# Patient Record
Sex: MALE | Race: WHITE
[De-identification: names, ages, dates, MRNs, and addresses within clinical notes are randomized per-mention and may not be internally consistent; named-entity substitution may affect disease eponyms.]

---

## 2017-06-21 ENCOUNTER — HOSPITAL ENCOUNTER (INPATIENT)
Dept: HOSPITAL 47 - 6SEL | Age: 67
LOS: 4 days | Discharge: HOME | DRG: 377 | End: 2017-06-25
Payer: MEDICARE

## 2017-06-21 VITALS — BODY MASS INDEX: 31.8 KG/M2

## 2017-06-21 DIAGNOSIS — N40.0: ICD-10-CM

## 2017-06-21 DIAGNOSIS — Z87.891: ICD-10-CM

## 2017-06-21 DIAGNOSIS — D62: ICD-10-CM

## 2017-06-21 DIAGNOSIS — R79.1: ICD-10-CM

## 2017-06-21 DIAGNOSIS — M19.90: ICD-10-CM

## 2017-06-21 DIAGNOSIS — I71.4: ICD-10-CM

## 2017-06-21 DIAGNOSIS — I25.5: ICD-10-CM

## 2017-06-21 DIAGNOSIS — T50.995A: ICD-10-CM

## 2017-06-21 DIAGNOSIS — K57.31: Primary | ICD-10-CM

## 2017-06-21 DIAGNOSIS — I25.2: ICD-10-CM
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
## 2017-06-21 DIAGNOSIS — Z95.1: ICD-10-CM

## 2017-06-21 DIAGNOSIS — J44.9: ICD-10-CM

## 2017-06-21 DIAGNOSIS — I48.2: ICD-10-CM

## 2017-06-21 DIAGNOSIS — Z88.8: ICD-10-CM

## 2017-06-21 DIAGNOSIS — E78.5: ICD-10-CM

## 2017-06-21 DIAGNOSIS — Z79.899: ICD-10-CM

## 2017-06-21 DIAGNOSIS — Z79.02: ICD-10-CM

## 2017-06-21 DIAGNOSIS — K21.9: ICD-10-CM

## 2017-06-21 DIAGNOSIS — Z79.82: ICD-10-CM

## 2017-06-21 DIAGNOSIS — I08.1: ICD-10-CM

## 2017-06-21 DIAGNOSIS — T45.515A: ICD-10-CM

## 2017-06-21 DIAGNOSIS — K29.60: ICD-10-CM

## 2017-06-21 DIAGNOSIS — Z95.5: ICD-10-CM

## 2017-06-21 DIAGNOSIS — K64.8: ICD-10-CM

## 2017-06-21 DIAGNOSIS — G47.33: ICD-10-CM

## 2017-06-21 DIAGNOSIS — I25.10: ICD-10-CM

## 2017-06-21 DIAGNOSIS — R57.8: ICD-10-CM

## 2017-06-21 DIAGNOSIS — E66.9: ICD-10-CM

## 2017-06-21 DIAGNOSIS — Z95.810: ICD-10-CM

## 2017-06-21 DIAGNOSIS — G72.0: ICD-10-CM

## 2017-06-21 DIAGNOSIS — I47.2: ICD-10-CM

## 2017-06-21 DIAGNOSIS — Z79.01: ICD-10-CM

## 2017-06-21 LAB
CH: 25.8
CHCM: 30.1
ERYTHROCYTE [DISTWIDTH] IN BLOOD BY AUTOMATED COUNT: 4.26 M/UL (ref 4.3–5.9)
ERYTHROCYTE [DISTWIDTH] IN BLOOD: 16.5 % (ref 11.5–15.5)
HCT VFR BLD AUTO: 36.6 % (ref 39–53)
HDW: 2.83
HGB BLD-MCNC: 10.9 GM/DL (ref 13–17.5)
INR PPP: 2.8 (ref ?–1.1)
MCH RBC QN AUTO: 25.7 PG (ref 25–35)
MCHC RBC AUTO-ENTMCNC: 29.9 G/DL (ref 31–37)
MCV RBC AUTO: 85.9 FL (ref 80–100)
PT BLD: 27.2 SEC (ref 9–12)
WBC # BLD AUTO: 11.4 K/UL (ref 3.8–10.6)

## 2017-06-21 PROCEDURE — 45378 DIAGNOSTIC COLONOSCOPY: CPT

## 2017-06-21 PROCEDURE — 88342 IMHCHEM/IMCYTCHM 1ST ANTB: CPT

## 2017-06-21 PROCEDURE — 84100 ASSAY OF PHOSPHORUS: CPT

## 2017-06-21 PROCEDURE — 43239 EGD BIOPSY SINGLE/MULTIPLE: CPT

## 2017-06-21 PROCEDURE — 94640 AIRWAY INHALATION TREATMENT: CPT

## 2017-06-21 PROCEDURE — 93306 TTE W/DOPPLER COMPLETE: CPT

## 2017-06-21 PROCEDURE — 86920 COMPATIBILITY TEST SPIN: CPT

## 2017-06-21 PROCEDURE — 85027 COMPLETE CBC AUTOMATED: CPT

## 2017-06-21 PROCEDURE — 86900 BLOOD TYPING SEROLOGIC ABO: CPT

## 2017-06-21 PROCEDURE — 71010: CPT

## 2017-06-21 PROCEDURE — 85610 PROTHROMBIN TIME: CPT

## 2017-06-21 PROCEDURE — 86850 RBC ANTIBODY SCREEN: CPT

## 2017-06-21 PROCEDURE — 80048 BASIC METABOLIC PNL TOTAL CA: CPT

## 2017-06-21 PROCEDURE — 83735 ASSAY OF MAGNESIUM: CPT

## 2017-06-21 PROCEDURE — 86901 BLOOD TYPING SEROLOGIC RH(D): CPT

## 2017-06-21 PROCEDURE — 88305 TISSUE EXAM BY PATHOLOGIST: CPT

## 2017-06-21 PROCEDURE — 85025 COMPLETE CBC W/AUTO DIFF WBC: CPT

## 2017-06-21 PROCEDURE — 84132 ASSAY OF SERUM POTASSIUM: CPT

## 2017-06-21 RX ADMIN — CEFAZOLIN SCH MLS/HR: 330 INJECTION, POWDER, FOR SOLUTION INTRAMUSCULAR; INTRAVENOUS at 23:23

## 2017-06-21 RX ADMIN — MEXILETINE HYDROCHLORIDE SCH MG: 150 CAPSULE ORAL at 23:07

## 2017-06-21 RX ADMIN — ONDANSETRON PRN MG: 2 INJECTION INTRAMUSCULAR; INTRAVENOUS at 23:07

## 2017-06-21 RX ADMIN — TAMSULOSIN HYDROCHLORIDE SCH MG: 0.4 CAPSULE ORAL at 23:07

## 2017-06-22 LAB
ANION GAP SERPL CALC-SCNC: 5 MMOL/L
BASOPHILS # BLD AUTO: 0 K/UL (ref 0–0.2)
BASOPHILS NFR BLD AUTO: 1 %
BUN SERPL-SCNC: 20 MG/DL (ref 9–20)
CALCIUM SPEC-MCNC: 7.7 MG/DL (ref 8.4–10.2)
CH: 25.2
CH: 25.6
CH: 25.8
CH: 26.2
CHCM: 29.3
CHCM: 30
CHCM: 30
CHCM: 30.4
CHLORIDE SERPL-SCNC: 112 MMOL/L (ref 98–107)
CO2 SERPL-SCNC: 20 MMOL/L (ref 22–30)
EOSINOPHIL # BLD AUTO: 0.4 K/UL (ref 0–0.7)
EOSINOPHIL NFR BLD AUTO: 4 %
ERYTHROCYTE [DISTWIDTH] IN BLOOD BY AUTOMATED COUNT: 2.78 M/UL (ref 4.3–5.9)
ERYTHROCYTE [DISTWIDTH] IN BLOOD BY AUTOMATED COUNT: 2.81 M/UL (ref 4.3–5.9)
ERYTHROCYTE [DISTWIDTH] IN BLOOD BY AUTOMATED COUNT: 3.2 M/UL (ref 4.3–5.9)
ERYTHROCYTE [DISTWIDTH] IN BLOOD BY AUTOMATED COUNT: 3.6 M/UL (ref 4.3–5.9)
ERYTHROCYTE [DISTWIDTH] IN BLOOD: 16.1 % (ref 11.5–15.5)
ERYTHROCYTE [DISTWIDTH] IN BLOOD: 16.3 % (ref 11.5–15.5)
ERYTHROCYTE [DISTWIDTH] IN BLOOD: 16.3 % (ref 11.5–15.5)
ERYTHROCYTE [DISTWIDTH] IN BLOOD: 17.4 % (ref 11.5–15.5)
GLUCOSE BLD-MCNC: 108 MG/DL (ref 75–99)
GLUCOSE SERPL-MCNC: 81 MG/DL (ref 74–99)
HCT VFR BLD AUTO: 23.7 % (ref 39–53)
HCT VFR BLD AUTO: 24.9 % (ref 39–53)
HCT VFR BLD AUTO: 27.5 % (ref 39–53)
HCT VFR BLD AUTO: 30.3 % (ref 39–53)
HDW: 2.83
HDW: 2.86
HDW: 2.91
HDW: 2.95
HGB BLD-MCNC: 7.2 GM/DL (ref 13–17.5)
HGB BLD-MCNC: 7.8 GM/DL (ref 13–17.5)
HGB BLD-MCNC: 8.5 GM/DL (ref 13–17.5)
HGB BLD-MCNC: 9.2 GM/DL (ref 13–17.5)
INR PPP: 2.4 (ref ?–1.1)
LUC NFR BLD AUTO: 4 %
LYMPHOCYTES # SPEC AUTO: 1.8 K/UL (ref 1–4.8)
LYMPHOCYTES NFR SPEC AUTO: 20 %
MAGNESIUM SPEC-SCNC: 1.8 MG/DL (ref 1.6–2.3)
MCH RBC QN AUTO: 25.6 PG (ref 25–35)
MCH RBC QN AUTO: 25.9 PG (ref 25–35)
MCH RBC QN AUTO: 26.7 PG (ref 25–35)
MCH RBC QN AUTO: 27.9 PG (ref 25–35)
MCHC RBC AUTO-ENTMCNC: 28.9 G/DL (ref 31–37)
MCHC RBC AUTO-ENTMCNC: 30.4 G/DL (ref 31–37)
MCHC RBC AUTO-ENTMCNC: 31 G/DL (ref 31–37)
MCHC RBC AUTO-ENTMCNC: 33.1 G/DL (ref 31–37)
MCV RBC AUTO: 84.2 FL (ref 80–100)
MCV RBC AUTO: 84.2 FL (ref 80–100)
MCV RBC AUTO: 86 FL (ref 80–100)
MCV RBC AUTO: 89.6 FL (ref 80–100)
MONOCYTES # BLD AUTO: 0.4 K/UL (ref 0–1)
MONOCYTES NFR BLD AUTO: 5 %
NEUTROPHILS # BLD AUTO: 6.2 K/UL (ref 1.3–7.7)
NEUTROPHILS NFR BLD AUTO: 67 %
NON-AFRICAN AMERICAN GFR(MDRD): >60
PHOSPHATE SERPL-MCNC: 2.8 MG/DL (ref 2.5–4.5)
POTASSIUM SERPL-SCNC: 4.2 MMOL/L (ref 3.5–5.1)
PT BLD: 23.1 SEC (ref 9–12)
SODIUM SERPL-SCNC: 137 MMOL/L (ref 137–145)
WBC # BLD AUTO: 0.33 10*3/UL
WBC # BLD AUTO: 7.4 K/UL (ref 3.8–10.6)
WBC # BLD AUTO: 7.7 K/UL (ref 3.8–10.6)
WBC # BLD AUTO: 8.3 K/UL (ref 3.8–10.6)
WBC # BLD AUTO: 9.3 K/UL (ref 3.8–10.6)
WBC (PEROX): 9.31

## 2017-06-22 RX ADMIN — ONDANSETRON PRN MG: 2 INJECTION INTRAMUSCULAR; INTRAVENOUS at 06:41

## 2017-06-22 RX ADMIN — ALLOPURINOL SCH MG: 100 TABLET ORAL at 20:31

## 2017-06-22 RX ADMIN — MEXILETINE HYDROCHLORIDE SCH MG: 150 CAPSULE ORAL at 07:50

## 2017-06-22 RX ADMIN — IPRATROPIUM BROMIDE AND ALBUTEROL SULFATE SCH ML: .5; 3 SOLUTION RESPIRATORY (INHALATION) at 16:21

## 2017-06-22 RX ADMIN — BUDESONIDE AND FORMOTEROL FUMARATE DIHYDRATE SCH PUFF: 80; 4.5 AEROSOL RESPIRATORY (INHALATION) at 08:28

## 2017-06-22 RX ADMIN — MEXILETINE HYDROCHLORIDE SCH MG: 150 CAPSULE ORAL at 17:02

## 2017-06-22 RX ADMIN — PANTOPRAZOLE SODIUM SCH MG: 40 INJECTION, POWDER, FOR SOLUTION INTRAVENOUS at 08:10

## 2017-06-22 RX ADMIN — METOPROLOL SUCCINATE SCH MG: 25 TABLET, EXTENDED RELEASE ORAL at 11:52

## 2017-06-22 RX ADMIN — MAGNESIUM SULFATE IN DEXTROSE SCH MLS/HR: 10 INJECTION, SOLUTION INTRAVENOUS at 06:45

## 2017-06-22 RX ADMIN — MAGNESIUM SULFATE IN DEXTROSE SCH MLS/HR: 10 INJECTION, SOLUTION INTRAVENOUS at 07:49

## 2017-06-22 RX ADMIN — IPRATROPIUM BROMIDE AND ALBUTEROL SULFATE SCH ML: .5; 3 SOLUTION RESPIRATORY (INHALATION) at 11:35

## 2017-06-22 RX ADMIN — ALLOPURINOL SCH MG: 100 TABLET ORAL at 08:10

## 2017-06-22 RX ADMIN — CEFAZOLIN SCH MLS/HR: 330 INJECTION, POWDER, FOR SOLUTION INTRAMUSCULAR; INTRAVENOUS at 00:35

## 2017-06-22 RX ADMIN — IPRATROPIUM BROMIDE AND ALBUTEROL SULFATE SCH ML: .5; 3 SOLUTION RESPIRATORY (INHALATION) at 19:25

## 2017-06-22 RX ADMIN — SPIRONOLACTONE SCH: 25 TABLET, FILM COATED ORAL at 09:26

## 2017-06-22 RX ADMIN — TAMSULOSIN HYDROCHLORIDE SCH MG: 0.4 CAPSULE ORAL at 20:31

## 2017-06-22 RX ADMIN — BUDESONIDE AND FORMOTEROL FUMARATE DIHYDRATE SCH PUFF: 80; 4.5 AEROSOL RESPIRATORY (INHALATION) at 19:24

## 2017-06-22 RX ADMIN — FUROSEMIDE SCH: 40 TABLET ORAL at 09:26

## 2017-06-22 RX ADMIN — LEVOFLOXACIN SCH: 5 INJECTION, SOLUTION INTRAVENOUS at 09:25

## 2017-06-22 RX ADMIN — IPRATROPIUM BROMIDE AND ALBUTEROL SULFATE SCH ML: .5; 3 SOLUTION RESPIRATORY (INHALATION) at 08:28

## 2017-06-22 NOTE — P.CONS
History of Present Illness





- Reason for Consult


Consult date: 06/22/17


GI bleed


Requesting physician: Jamar Agudelo





- History of Present Illness


66-year-old gentleman patient of Dr. Willie Jaramillo with a history of MI, 

CABG, DVT PE atrial fibrillation Coumadin maintenance AICD CAD with PCI 

[place] filter and COPD presents with acute GI bleed.  Yesterday he 

developed lower abdominal discomfort followed by crampy burgundy liquid 

diarrhea.  Denies hematemesis coffee ground emesis.  No NSAID or aspirin usage.

  No alcohol.  Episode of lower GI bleed rectal bleeding 1-2 years ago without 

requiring medical treatment.  Last colonoscopy to his memory 5 years ago; 

diverticular disease.  No EGD recently.  Admission INR 2.8 presently 2.4.  

Hemoglobin 10.9 presently 8.5.  Platelet 178.  BUN 20 creatinine 1.1.





Echo tenses yesterday transferred to the ICU for further monitoring.  No 

pressors.  Denies chest pain or shortness of breath.








Review of Systems


Constitutional: Denies fever, chills, sweats, weight gain, or loss.


HEENT: Negative for migraines, blurred vision or loss, earaches, drainage, 

tinnitus, oral mucosal lesions, dysphagia, or odynophagia.


Cardiac: A. fib.  PE DVT.  MI.  CAD.  CABG.  AICD.  Negative for chest pain, 

arrhythmias, or palpitation.


Respiratory: COPD.  ANJELICA.  Negative for shortness of breath, hemoptysis, cough, 

or sputum production.


Gastrointestinal: See HPI for pertinent findings.


Genitourinary: Negative for hematuria, urgency, frequency, polyuria, dysuria, 

or penile discharge.


Musculoskeletal: Negative for muscle aches, swelling, arthritis, and 

arthralgias.  


Neurologic: Negative for stroke or TIA.


Endocrine: Negative for thyroid problems.


Skin: Negative for rash or itching.


Psychiatric: Negative history for depression and anxiety





All systems: negative (See HPI)





Past Medical History


Past Medical History: COPD, Deep Vein Thrombosis (DVT), GERD/Reflux, Myocardial 

Infarction (MI), Osteoarthritis (OA), Pulmonary Embolus (PE), Sleep Apnea/CPAP/

BIPAP


Additional Past Medical History / Comment(s): SLEEP APNEA DOES'NT USE CPAP 

MACHINE.  SEE DR LADONNA RAMOS&P, "NOSE BLEEDS SINCE MONDAY" (2016)


Last Myocardial Infarction Date:: 1999


History of Any Multi-Drug Resistant Organisms: None Reported


Past Surgical History: AICD, Cardiac Ablation, Cholecystectomy, Coronary Bypass/

CABG, EPS, Heart Catheterization With Stent, Orthopedic Surgery


Additional Past Surgical History / Comment(s): CARDIOVERSIONS, CABG X4 (12/8/13)

, STENT X3, VERO FILTER,   RT ANKLE SX WITH MESH. 4-12-16 ep study-see dr sol h&p


Past Anesthesia/Blood Transfusion Reactions: No Reported Reaction


Additional Past Anesthesia/Blood Transfusion Reaction / Comm: MOM PONV


Date of Last Stent Placement:: 06/2015


Type of Cardiac Device: AICD


Device Placement Date:: UNKNOWN, IAT-Auto


Past Psychological History: No Psychological Hx Reported


Additional Psychological History / Comment(s): PT LIVES WITH HIS WIFE IS 

INDEPENDANT BUT ON DISBILTY. USED TO WORK IN CONSTRUCTION


Smoking Status: Former smoker


Past Alcohol Use History: None Reported


Additional Past Alcohol Use History / Comment(s): STARTED SMOKING 1968-QUIT 2013

, SMOKED 1 AND 1/2PPD


Past Drug Use History: None Reported





- Past Family History


  ** Brother(s)


Family Medical History: Cancer


Additional Family Medical History / Comment(s): LEUKEMIA





  ** Mother


Additional Family Medical History / Comment(s): ENLARGED HEART





  ** Father


Additional Family Medical History / Comment(s): BLOOD CLOT IN THE BRAIN





Medications and Allergies


 Home Medications











 Medication  Instructions  Recorded  Confirmed  Type


 


Albuterol Sulfate [Proair Hfa] 1 puff INHALATION RT-QID PRN 06/03/14 06/21/17 

History


 


Aspirin 81 mg PO DAILY 06/03/14 06/21/17 History


 


Fluticasone/Salmeterol [Advair 1 puff INHALATION RT-BID 06/03/14 06/21/17 

History





250-50 Diskus]    


 


Spironolactone [Aldactone] 25 mg PO DAILY 06/03/14 06/21/17 History


 


Warfarin Sodium [Coumadin] 4 mg PO SUMOWEFR 06/03/14 06/21/17 History


 


Ipratropium Bromide [Atrovent Hfa] 2 puff INHALATION RT-TID PRN 07/08/14 06/21/ 17 History


 


Furosemide [Lasix] 40 mg PO DAILY 06/02/15 06/21/17 History


 


Albuterol Nebulized [Ventolin 2.5 mg INHALATION RT-QID PRN 04/07/16 06/21/17 

History





Nebulized]    


 


Sacubitril/Valsartan [Entresto 24 0.5 tab PO BID 04/07/16 06/21/17 History





mg-26 mg Tablet]    


 


Allopurinol [Zyloprim] 200 mg PO BID 06/21/17 06/21/17 History


 


Celecoxib [CeleBREX] 200 mg PO HS 06/21/17 06/21/17 History


 


Metoprolol Succinate (ER) [Toprol 50 mg PO BID 06/21/17 06/21/17 History





Xl]    


 


Multivitamins, Thera [Multivitamin 1 tab PO DAILY 06/21/17 06/21/17 History





(formulary)]    


 


Tamsulosin [Flomax] 0.4 mg PO HS 06/21/17 06/21/17 History


 


Warfarin Sodium [Coumadin] 6 mg PO TUTHSA 06/21/17 06/21/17 History











 Allergies











Allergy/AdvReac Type Severity Reaction Status Date / Time


 


alirocumab Allergy  Swelling Verified 06/21/17 22:19





[From Praluent Pen]     


 


Statins-Hmg-Coa Reductase Allergy  Swelling/My Verified 06/21/17 22:19





Inhibitor   algia  


 


steriods AdvReac  Abdominal Uncoded 06/21/17 22:19





   Pain/Constipation  














Physical Exam


Vitals: 


 Vital Signs











  Temp Pulse Pulse Resp BP BP BP


 


 06/22/17 10:00   71   22  85/57  


 


 06/22/17 09:00   83   21  88/61  


 


 06/22/17 08:40   65     


 


 06/22/17 08:32   63     


 


 06/22/17 08:30   74   19  90/65  


 


 06/22/17 08:00   71  67  16  90/65  


 


 06/22/17 07:30  98.2 F  76   21  93/63  


 


 06/22/17 07:00   70    89/58  


 


 06/22/17 06:30   71   14  89/58  


 


 06/22/17 06:00   77   16  91/67  


 


 06/22/17 05:30   84   16  91/67  


 


 06/22/17 05:00   66   17  91/61  


 


 06/22/17 04:30   73   23  97/64  


 


 06/22/17 04:00  98.2 F  79  67  13  94/55  


 


 06/22/17 03:30   70   14  93/61  


 


 06/22/17 03:00   64   14  92/66  


 


 06/22/17 02:30   74   14  92/52  


 


 06/22/17 02:00   72   14  92/53  


 


 06/22/17 01:30   72   26 H  97/66  


 


 06/22/17 01:00  98.2 F  68   21  101/63  


 


 06/22/17 00:30   75   9 L  107/71  


 


 06/22/17 00:16   79   14  107/71  


 


 06/21/17 23:05  97.3 F L   73  16    81/51


 


 06/21/17 20:31  97.3 F L   65  16   105/72 














  Pulse Ox


 


 06/22/17 10:00  95


 


 06/22/17 09:00 


 


 06/22/17 08:40 


 


 06/22/17 08:32 


 


 06/22/17 08:30  99


 


 06/22/17 08:00  97


 


 06/22/17 07:30  99


 


 06/22/17 07:00 


 


 06/22/17 06:30 


 


 06/22/17 06:00 


 


 06/22/17 05:30 


 


 06/22/17 05:00  99


 


 06/22/17 04:30  96


 


 06/22/17 04:00  98


 


 06/22/17 03:30  95


 


 06/22/17 03:00  97


 


 06/22/17 02:30  97


 


 06/22/17 02:00  98


 


 06/22/17 01:30  99


 


 06/22/17 01:00  99


 


 06/22/17 00:30  99


 


 06/22/17 00:16  100


 


 06/21/17 23:05  97


 


 06/21/17 20:31  99








 Intake and Output











 06/21/17 06/22/17 06/22/17





 22:59 06:59 14:59


 


Intake Total  1740 840


 


Output Total  0 


 


Balance  1740 840


 


Intake:   


 


  IV  1740 740


 


    Sodium Chloride 0.9% 1,  240 740





    000 ml @ 60 mls/hr IV .   





    [id number] NINI Rx#:[id number]   


 


    Sodium Chloride 0.9% 1,  1000 





    000 ml @ 999 mls/hr IV .   





    Q1H1M ONE Rx#:[id number]   


 


    Sodium Chloride 0.9% 500  500 





    ml @ 999 mls/hr IV .Q31M   





    ONE Rx#:[id number]   


 


  Intake, IV Titration   100





  Amount   


 


    Magnesium Sulfate-D5w Pmx   100





    1 gm In Dextrose/Water 1   





    100ml.bag @ 100 mls/hr   





    IVPB Q1H NINI Rx#:   





    [id number]   


 


Output:   


 


  Urine  0 


 


Other:   


 


  Voiding Method  Urinal Urinal


 


  # Voids  1 


 


  # Bowel Movements 2 1 1


 


  Weight 97.069 kg 96.1 kg 











General appearance: The patient is alert, oriented, in no acute distress.


HET: Head is normocephalic and atraumatic.  Pupils are equal and reactive.  

Oropharynx is clear without lesions.


Neck: Supple without lymphadenopathy.  Trachea midline.


Heart: S1 S2.  Regular rate and rhythm.


Lungs: No crackles or wheezes are heard.


Abdomen: Soft, left-sided abdominal tenderness, nondistended with  bowel 

sounds.  No peritoneal signs.  No palpable organomegaly or masses.


Extremities: Normal skin color and turgor.  No cyanosis, rash, ulceration, 

clubbing, or edema.  Radial and pedal pulses are 2/4 bilaterally.


Neurological: No focal deficits.  Strength and sensation are grossly intact.








Results


CBC & Chem 7: 


 06/22/17 10:03





 06/22/17 03:52


Labs: 


 Abnormal Lab Results - Last 24 Hours (Table)











  06/21/17 06/21/17 06/22/17 Range/Units





  22:25 22:28 00:07 


 


WBC  11.4 H    (3.8-10.6)  k/uL


 


RBC  4.26 L    (4.30-5.90)  m/uL


 


Hgb  10.9 L    (13.0-17.5)  gm/dL


 


Hct  36.6 L    (39.0-53.0)  %


 


MCHC  29.9 L    (31.0-37.0)  g/dL


 


RDW  16.5 H    (11.5-15.5)  %


 


PT   27.2 H   (9.0-12.0)  sec


 


Chloride     ()  mmol/L


 


Carbon Dioxide     (22-30)  mmol/L


 


POC Glucose (mg/dL)    108 H  (75-99)  mg/dL


 


Calcium     (8.4-10.2)  mg/dL














  06/22/17 06/22/17 06/22/17 Range/Units





  03:52 03:52 03:52 


 


WBC     (3.8-10.6)  k/uL


 


RBC  3.60 L    (4.30-5.90)  m/uL


 


Hgb  9.2 L D    (13.0-17.5)  gm/dL


 


Hct  30.3 L    (39.0-53.0)  %


 


MCHC  30.4 L    (31.0-37.0)  g/dL


 


RDW  16.3 H    (11.5-15.5)  %


 


PT    23.1 H  (9.0-12.0)  sec


 


Chloride   112 H   ()  mmol/L


 


Carbon Dioxide   20 L   (22-30)  mmol/L


 


POC Glucose (mg/dL)     (75-99)  mg/dL


 


Calcium   7.7 L   (8.4-10.2)  mg/dL














  06/22/17 Range/Units





  10:03 


 


WBC   (3.8-10.6)  k/uL


 


RBC  3.20 L  (4.30-5.90)  m/uL


 


Hgb  8.5 L  (13.0-17.5)  gm/dL


 


Hct  27.5 L  (39.0-53.0)  %


 


MCHC   (31.0-37.0)  g/dL


 


RDW  16.3 H  (11.5-15.5)  %


 


PT   (9.0-12.0)  sec


 


Chloride   ()  mmol/L


 


Carbon Dioxide   (22-30)  mmol/L


 


POC Glucose (mg/dL)   (75-99)  mg/dL


 


Calcium   (8.4-10.2)  mg/dL














Assessment and Plan


(1) Acute GI bleeding


Narrative/Plan: 


66-year-old gentleman with a history of significant cardiac history including 

atrial fibrillation with Coumadin monitoring, PE DVT, CAD MI with AICD CABG 

presents with acute lower GI bleed burgundy-colored bowel movements with 

abdominal pain and hypotension with history of underlying diverticulosis most 

likely exacerbated by Coumadin-induced coagulopathy.  Suspect bleeding could be 

related to diverticular bleeding however ischemic colitis needs to be kept 

within the differential.  Upper GI pathology cannot be entirely excluded.


Status: Acute   





(2) Acute blood loss anemia


Status: Acute   





(3) Warfarin-induced coagulopathy


Status: Acute   





(4) Symptomatic anemia


Status: Acute   


Plan: 


1.  Protonix 40 mg IV daily.


2.  CBC monitoring.  


3.  Clear liquid diet.


4.  EGD colonoscopy once INR is 1.5 or less.  Endoscopy as early as Saturday 

pending clinical course.


5.  Hold Coumadin.  Will follow closely with you.





The gastroenterologist has discussed the risks, benefits and alternative 

therapies for the above-mentioned procedure and for both sedation/analgesia as 

well as necessary blood product administration, if indicated, as they pertain 

to this patient.  The patient has indicated understanding and acceptance of the 

risks and procedures discussed.





Thank you for this kind referral and the opportunity to participate in the care 

of your patient.  This consultation was discussed with Dr. Hunter.  The 

impression and plan of care have been directed as dictated.

## 2017-06-22 NOTE — CONS
DATE OF CONSULTATION:  



Mr. Hess is a 66-year-old male who was transferred from [facility] because of GI bleeding. Patient has been followed by Dr. Concepcion on a regular basis. He has a known history of severe ischemic 

cardiomyopathy, status post ICD implant, history of atrial 

fibrillation, history of sustained ventricular tachycardia who 

presented with symptoms of lower GI bleeding. He had prior similar 

symptoms in the past and was diagnosed with diverticulosis. He has 

some nausea. His breathing has been stable. He is denying any chest 

pain. He denies any palpitation. He has no peripheral edema. No clear 

PND. No orthopnea. According to him, he had a discharge from the 

device about 2 months ago because he was not taking his medication. 

His ejection fraction in the past was in the 20%. He does not feel 

that his cardiac status is any different than before. He has prior 

history of drug-induced myopathy and has not been on a statin. His 

level of activity has been stable up to this admission. On 

presentation, he was hypotensive and was given fluid. His blood 

pressure is running in the high 80s, but he is asymptomatic. According 

to him, his baseline blood pressure is in the low 100s. His coronary 

risk factors are remarkable for history of hypertension, remote 

history of smoking as well as prior history of hyperlipidemia.  



His medications include Coumadin, Flomax, Aldactone 25 mg daily, 

Entresto 24- 26 mg daily, Mexitil 150 mg 3 times a day, Toprol XL 50 

mg twice a day, Lasix 40 mg daily, Plavix 75 mg daily, aspirin once a 

day, Zyloprim, ProAir and Ventolin.  



REVIEW OF SYSTEMS:

RESPIRATORY SYSTEM: He has no recent wheezing. No cough or fever. 

GI SYSTEM: He had the GI bleeding as noted, the nausea and prior 

history of diverticulosis.  

 SYSTEM: No dysuria or hematuria. 

NERVOUS SYSTEM: No history of stroke or seizure. 



PHYSICAL EXAMINATION: A 66-year-old male, alert, oriented, in no 

apparent distress. Blood pressure 96/60 with the heart rate in the 

70s.  

HEAD: Normocephalic. 

EYES: Sclerae anicteric. 

NECK: No bruit. 

LUNGS: Clear to auscultation. 

HEART: Irregular, irregular. S1, S2, no S3, with systolic murmur at 

the base. No diastolic murmur.  

ABDOMEN: Soft, nontender, positive bowel sounds. 

EXTREMITIES: No edema. 



LAB DATA: Hemoglobin of 9.2. His INR is 2.4. It was 2.8 yesterday. BUN 

and creatinine of 20 and 1.1. Potassium 4.2. His EKG shows atrial 

fibrillation with ventricular pacing.  



IMPRESSION:

1. Lower gastrointestinal bleeding appears to be related to 

diverticulosis. Patient had a known history of diverticulosis.  

2. Severe ischemic cardiomyopathy, status post bi-V ICD implant. 

3. Atrial fibrillation. 

4. Status post coronary artery bypass grafting and percutaneous 

revascularization.  

5. 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 

6. History of ventricular tachycardia. 

7. Hypotension, at this point related to the bleeding. 



RECOMMENDATION: Will hold the Entresto and the diuretic at this time. 

I will start him on a lower dose of beta blocker. I will obtain 

echocardiogram with Doppler. His Coumadin is on hold. Patient does not 

require to go back on Plavix, since his stent was done more than a 

year ago and he can be on Coumadin alone, at this time he has stable 

chronic coronary artery disease. Once his GI bleeding is stabilized, 

then I would recommend to resume his Coumadin, but I will avoid 

antiplatelet treatment.  



Thank you for this consult. We will follow with you.

## 2017-06-22 NOTE — HP
DATE OF ADMISSION:  06/21/2017



CHIEF COMPLAINT:  Lower gastrointestinal bleeding. 





HISTORY OF PRESENT ILLNESS: This 66-year-old gentleman with a past 

medical history of COPD, DVT, gastroesophageal reflux disease, history 

of myocardial infarction, history of DJD, history of pulmonary 

embolus, obstructive sleep apnea, history of AICD, cardiac ablation,  

history of cholecystectomy, coronary artery disease, coronary artery 

bypass grafting, history of coronary artery disease, stent being 

followed by a primary physician in [place] are presented to 

[facility] and subsequently referred to [facility] 

with direct admission for lower gastrointestinal bleeding.  Bleeding 

started last night, mild to moderate quantity but continued throughout 

two days and multiple episodes of bleeding and the patient also 

complained of lower abdominal cramping. The CAT scan done in [facility] showed evidence of diverticulitis and the patient 

admitted for further evaluation and treatment. There is no history of 

any fever, rigors or chills. No history of headache, loss of 

consciousness, seizures.  



PAST MEDICAL HISTORY: COPD, DVT, gastroesophageal reflux disease, 

myocardial infarction, DJD, pulmonary embolism, sleep apnea, history 

of AICD , cardiac ablation, CBD and stent.  



Medications prior to admission include home medications are: 

1. Coumadin 6 mg Tuesday, Thursday and Saturday, 4 mg Sunday, Monday, 

Wednesday and Friday.   

2. Flomax 0.4 q6h p.r.n.  

3. Aldactone 25 mg daily. 

4. Entresto 24/26.5 mg b.i.d. 

5. Nitrostat 0.4 sublingual p.r.n. 

6. Multivitamins one p.o. daily.

7. Mexitil 150 mg q.8. 

8. Metoprolol 50 mg p.o. daily. 

9. Atrovent 2 puffs p.o. t.i.d. p.r.n. 

10. Lasix 40 mg daily. 

11. Advair 250/50 1 puff b.i.d. 

12. Plavix 75 milligrams daily. 

13. Celebrex 200 mg q.h.s. 

14. Aspirin 81 mg daily. 

15. Zyloprim 200 mg b.i.d. 

16. Pro-Air HFA 1 puff p.o. daily. 

17. Ventolin 2.5 q.i.d. p.r.n. 



ALLERGIES: ALAROCUMB, STATINS, STEROIDS.



FAMILY HISTORY: History of cancer, leukemia in the family. 



SOCIAL HISTORY: Previous history of smoking.  Occasional alcohol  

intake.  



REVIEW OF SYSTEMS:

ENT: As mentioned earlier. 

CARDIOVASCULAR: As mentioned earlier. 

RESPIRATORY: As mentioned earlier.

GI: As mentioned earlier. 

GI: As mentioned earlier. 

: No dysuria. Nervous system: No numbness, weakness. 

ALLERGY/IMMUNOLOGY: No asthma or hayfever. 

MUSCULOSKELETAL: As mentioned earlier.

HEMATOLOGY/ONCOLOGY: As mentioned earlier.

ENDOCRINE: no history of diabetes, hypothyroidism.

CONSTITUTIONAL: As mentioned earlier.

DERMATOLOGY: Negative.

RHEUMATOLOGY: Negative.

PSYCHIATRY: As mentioned earlier.



PHYSICAL EXAMINATION:  The patient is alert and oriented times three.  

Pulse is 73, blood pressure 81/51, respiratory  rate 16, temperature 

97.3, pulse ox 97% on room air.  

HEENT: Conjunctivae pale.  Oral mucosa moist. 

NECK: No jugular venous distention.  No carotid bruit. No lymph node 

enlargement.  

CARDIOVASCULAR: S1, S2 muffled.  No S3, no S4. 

RESPIRATORY: Breath sounds diminished at the bases.  A few scattered 

rhonchi, no crackles.  

ABDOMEN: Soft. Mild diffuse discomfort in the lower part of the 

abdomen. No guarding. No rigidity. No mass palpable. Bowel sounds 

present. There is no ascites. Legs: No edema. No swelling. Nervous 

system: Higher function as mentioned.  Moves all four limbs. No focal 

deficits.  

LYMPHATICS: No lymph nodes palpable in the neck, axillae or groin.  

SKIN: No ulcer, rash or bleeding. 



LABS:  WBC 11.2, hemoglobin is 10.9. Platelets are 206.  INR is 2.8. 



ASSESSMENT:

1. Acute lower gastrointestinal bleeding with possibly diverticulosis 

and diverticulitis with acute blood loss anemia.  

2. Hypertension and hypertensive shock secondary to blood loss anemia. 

3. Increased WBC. 

4. Anemia, normocytic. 

5. Coumadin monitoring. 

6. History of chronic obstructive pulmonary disease. 

7. History of deep venous thrombosis and pulmonary embolus. 

8. History of gastroesophageal reflux disease.

9. History of myocardial infarction.

10. History of sleep apnea. 

11. History of degenerative joint disease. 

12. History of sleep apnea uses CPAP. 

13. History of automatic implantable cardioverter defibrillator. 

14. History of cardiac catheterization ablation.

15. History of cholecystectomy.

16. History of coronary artery disease, coronary artery bypass 

grafting and stenting.  

17. History of nicotine dependence. 

18. Obesity, body mass index of 30.7.

19. FULL CODE.



RECOMMENDATIONS AND DISCUSSION:  In this  66 -year-old who presented 

with multiple complex medical issues, we will monitor the patient 

closely. Continue with current medications.  Continue symptomatic  

treatment.  I recommend to hold the antiplatelet agents and continue 

to monitor. The patient has significant GI bleed resulting in 

hypotension. I would recommend reversal of Coumadin as well. Monitor 

in ICU closely with Dr. Albrecht, gastroenterology and also cardiology as 

well.  The prognosis is guarded because of multiple complex medical 

issues. Further recommendations to follow.  See orders for further 

details.  We will hold the antihypertensive medications also for now.

## 2017-06-22 NOTE — ECHOF
Referral Reason:cm



MEASUREMENTS

--------

HEIGHT: 180.3 cm

WEIGHT: 95.7 kg

BP: 96/61

IVSd:   1.0 cm     (0.6 - 1.1)

LVIDd:   6.5 cm     (3.9 - 5.3)

LVPWd:   0.9 cm     (0.6 - 1.1)

IVSs:   1.2 cm

LVIDs:   6.2 cm

LVPWs:   1.1 cm

LAESV Index (A-L):   49.51 ml/m

Ao Diam:   3.5 cm     (2.0 - 3.7)

AV Cusp:   1.8 cm     (1.5 - 2.6)

LA Diam:   5.2 cm     (2.7 - 3.8)

MV EXCURSION:   20.694 mm     (> 18.000)

MV EF SLOPE:   69 mm/s     (70 - 150)

EPSS:   2.2 cm

MV E Handy:   1.01 m/s

MV DecT:   167 ms

MV A Handy:   0.46 m/s

MV E/A Ratio:   2.21 

AR PHT:   405 ms

RAP:   5.00 mmHg

RVSP:   34.94 mmHg







FINDINGS

--------

Pacerwire seen in RV and RA.   AICD

This was a technically good study.

The left ventricle is moderately dilated.   There is 

severe global hypokinesis of LV .   Overall left 

ventricular systolic function is severely impaired 

with, an EF between 25 - 30 %.   Mitral Doppler inflow 

pattern suggests diastolic filling abnormality 22.28.   

Known cardiomyopathy

The right ventricle is normal in size and function.

LA is severely dilated >40 ml/m2

The right atrium is normal in size.

Aortic valve is trileaflet and is mildly thickened.   

Trace amount of aortic regurgitation.

The mitral valve leaflets are mildly thickened.   Mild 

mitral regurgitation is present.

Mild tricuspid regurgitation present.   The right 

ventricular systolic pressure, as measured by Doppler, 

is 34.94mmHg.

Pulmonic valve appears structurally normal.

The aortic root size is normal.

Normal inferior vena cava with normal inspiratory 

collapse consistent with estimated right atrial 

pressure of  5 mmHg.

The pericardium is normal.



CONCLUSIONS

--------

1. Pacerwire seen in RV and RA.

2. LA is severely dilated >40 ml/m2

3. The right atrium is normal in size.

4. Aortic valve is trileaflet and is mildly thickened.

5. Trace amount of aortic regurgitation.

6. The mitral valve leaflets are mildly thickened.

7. Mild mitral regurgitation is present.

8. Mild tricuspid regurgitation present.

9. The right ventricular systolic pressure, as measured by 

Doppler, is 34.94mmHg.

10. Pulmonic valve appears structurally normal.

11. The aortic root size is normal.

12. AICD

13. Normal inferior vena cava with normal inspiratory 

collapse consistent with estimated right atrial 

pressure of  5 mmHg.

14. The pericardium is normal.

15. This was a technically good study.

16. The left ventricle is moderately dilated.

17. There is severe global hypokinesis of LV .

18. Overall left ventricular systolic function is severely 

impaired with, an EF between 25 - 30 %.

19. Mitral Doppler inflow pattern suggest diastolic filling 

abnormality 22.28.

20. Known cardiomyopathy

21. The right ventricle is normal in size and function.





SONOGRAPHER: [place] RDCS

## 2017-06-22 NOTE — P.CNPUL
History of Present Illness


Consult date: 06/22/17


Requesting physician: Jamar Agudelo


Reason for consult: other (Critical care management)


Chief complaint: GI bleed


History of present illness: 





This is a very pleasant 66-year-old woman who follows with Dr. Tapia in the 

[place] area.  He has a past history of chronic obstructive pulmonary 

disease treated with Advair and pro-air, former smoker quit in 2013, sleep 

apnea does not utilize CPAP, hyperlipidemia, benign prosthetic hypertrophy, 

myocardial infarctions 2, ischemic cardiomyopathy with ejection fraction less 

than 20%, status post AICD placement, ablations, status post coronary bypass 

surgery.  Has has a history of atrial fibrillation, PE/DVT, V[place] filter 

placement he is anticoagulated with warfarin.  He does have a history of 

diverticulosis diagnosed proximal a 5-6 years ago and then had an episode of 

bleeding most recently 1-1/2 years ago.  2 evenings ago he started having 

painless dark stools and presented to [place] for the same.  A computed 

tomography scan of the abdomen did reveal evidence of diverticulosis without 

diverticulitis.  There is a stable distal abdominal aortic aneurysm.  Evidence 

of cholecystectomy.  He was subsequently transferred here directly to the 

selective care unit.  Once there he continued to have evidence of GI bleeding 

and hypotension and was subsequently transferred here to the intensive care 

unit.  He is seen today in consultation.  He is currently awake and alert in no 

acute distress.  His hemoglobin has drifted from 10.9-9.2 currently.  His 

initial INR was 2.8 currently 2.4 he did receive vitamin K.  He does have 

complaints of vague abdominal pain otherwise no other complaints.  No shortness 

of breath, cough or congestion.  No chest pain palpitations lightheadedness or 

dizziness.  His mean arterial pressure has remained 60-65.  Not currently on 

any pressors.  His 0.9 normal saline at 60 miles per hour.  Maintaining O2 

saturations in the 90s on room air.





Review of Systems





14 point review of system was conducted.  All negative other than as mentioned 

in HPI.





Past Medical History


Past Medical History: COPD, Deep Vein Thrombosis (DVT), GERD/Reflux, Myocardial 

Infarction (MI), Osteoarthritis (OA), Pulmonary Embolus (PE), Sleep Apnea/CPAP/

BIPAP


Additional Past Medical History / Comment(s): SLEEP APNEA DOES'NT USE CPAP 

MACHINE.  SEE DR DOUGHERTY H&P, "NOSE BLEEDS SINCE MONDAY" (2016)


Last Myocardial Infarction Date:: 1999


History of Any Multi-Drug Resistant Organisms: None Reported


Past Surgical History: AICD, Cardiac Ablation, Cholecystectomy, Coronary Bypass/

CABG, EPS, Heart Catheterization With Stent, Orthopedic Surgery


Additional Past Surgical History / Comment(s): CARDIOVERSIONS, CABG X4 (12/8/13)

, STENT X3, VERO FILTER,   RT ANKLE SX WITH MESH. 4-12-16 ep study-see dr dougherty h&p


Past Anesthesia/Blood Transfusion Reactions: No Reported Reaction


Additional Past Anesthesia/Blood Transfusion Reaction / Comment(s): MOM PONV


Date of Last Stent Placement:: 06/2015


Type of Cardiac Device: AICD


Device Placement Date:: UNKNOWN, AudioTrip


Past Psychological History: No Psychological Hx Reported


Additional Psychological History / Comment(s): PT LIVES WITH HIS WIFE IS 

INDEPENDANT BUT ON DISBILTY. USED TO WORK IN CONSTRUCTION


Smoking Status: Former smoker


Past Alcohol Use History: None Reported


Additional Past Alcohol Use History / Comment(s): STARTED SMOKING 1968-QUIT 2013

, SMOKED 1 AND 1/2PPD


Past Drug Use History: None Reported





- Past Family History


  ** Brother(s)


Family Medical History: Cancer


Additional Family Medical History / Comment(s): LEUKEMIA





  ** Mother


Additional Family Medical History / Comment(s): ENLARGED HEART





  ** Father


Additional Family Medical History / Comment(s): BLOOD CLOT IN THE BRAIN





Medications and Allergies


 Home Medications











 Medication  Instructions  Recorded  Confirmed  Type


 


Albuterol Sulfate [Proair Hfa] 1 puff INHALATION RT-QID PRN 06/03/14 06/21/17 

History


 


Aspirin 81 mg PO DAILY 06/03/14 06/21/17 History


 


Fluticasone/Salmeterol [Advair 1 puff INHALATION RT-BID 06/03/14 06/21/17 

History





250-50 Diskus]    


 


Spironolactone [Aldactone] 25 mg PO DAILY 06/03/14 06/21/17 History


 


Warfarin Sodium [Coumadin] 4 mg PO SUMOWEFR 06/03/14 06/21/17 History


 


Ipratropium Bromide [Atrovent Hfa] 2 puff INHALATION RT-TID PRN 07/08/14 06/21/ 17 History


 


Furosemide [Lasix] 40 mg PO DAILY 06/02/15 06/21/17 History


 


Albuterol Nebulized [Ventolin 2.5 mg INHALATION RT-QID PRN 04/07/16 06/21/17 

History





Nebulized]    


 


Sacubitril/Valsartan [Entresto 24 0.5 tab PO BID 04/07/16 06/21/17 History





mg-26 mg Tablet]    


 


Allopurinol [Zyloprim] 200 mg PO BID 06/21/17 06/21/17 History


 


Celecoxib [CeleBREX] 200 mg PO HS 06/21/17 06/21/17 History


 


Metoprolol Succinate (ER) [Toprol 50 mg PO BID 06/21/17 06/21/17 History





Xl]    


 


Multivitamins, Thera [Multivitamin 1 tab PO DAILY 06/21/17 06/21/17 History





(formulary)]    


 


Tamsulosin [Flomax] 0.4 mg PO HS 06/21/17 06/21/17 History


 


Warfarin Sodium [Coumadin] 6 mg PO TUTHSA 06/21/17 06/21/17 History











 Allergies











Allergy/AdvReac Type Severity Reaction Status Date / Time


 


alirocumab Allergy  Swelling Verified 06/21/17 22:19





[From Praluent Pen]     


 


Statins-Hmg-Coa Reductase Allergy  Swelling/My Verified 06/21/17 22:19





Inhibitor   algia  


 


steriods AdvReac  Abdominal Uncoded 06/21/17 22:19





   Pain/Constipation  














Physical Exam


Vitals: 


 Vital Signs











  Temp Pulse Pulse Resp BP BP BP


 


 06/22/17 09:00   83   21  88/61  


 


 06/22/17 08:40   65     


 


 06/22/17 08:32   63     


 


 06/22/17 08:30   74   19  90/65  


 


 06/22/17 08:00   71  67  16  90/65  


 


 06/22/17 07:30  98.2 F  76   21  93/63  


 


 06/22/17 07:00   70    89/58  


 


 06/22/17 06:30   71   14  89/58  


 


 06/22/17 06:00   77   16  91/67  


 


 06/22/17 05:30   84   16  91/67  


 


 06/22/17 05:00   66   17  91/61  


 


 06/22/17 04:30   73   23  97/64  


 


 06/22/17 04:00  98.2 F  79  67  13  94/55  


 


 06/22/17 03:30   70   14  93/61  


 


 06/22/17 03:00   64   14  92/66  


 


 06/22/17 02:30   74   14  92/52  


 


 06/22/17 02:00   72   14  92/53  


 


 06/22/17 01:30   72   26 H  97/66  


 


 06/22/17 01:00  98.2 F  68   21  101/63  


 


 06/22/17 00:30   75   9 L  107/71  


 


 06/22/17 00:16   79   14  107/71  


 


 06/21/17 23:05  97.3 F L   73  16    81/51


 


 06/21/17 20:31  97.3 F L   65  16   105/72 














  Pulse Ox


 


 06/22/17 09:00 


 


 06/22/17 08:40 


 


 06/22/17 08:32 


 


 06/22/17 08:30  99


 


 06/22/17 08:00  97


 


 06/22/17 07:30  99


 


 06/22/17 07:00 


 


 06/22/17 06:30 


 


 06/22/17 06:00 


 


 06/22/17 05:30 


 


 06/22/17 05:00  99


 


 06/22/17 04:30  96


 


 06/22/17 04:00  98


 


 06/22/17 03:30  95


 


 06/22/17 03:00  97


 


 06/22/17 02:30  97


 


 06/22/17 02:00  98


 


 06/22/17 01:30  99


 


 06/22/17 01:00  99


 


 06/22/17 00:30  99


 


 06/22/17 00:16  100


 


 06/21/17 23:05  97


 


 06/21/17 20:31  99








 Intake and Output











 06/21/17 06/22/17 06/22/17





 22:59 06:59 14:59


 


Intake Total  1740 280


 


Output Total  0 


 


Balance  1740 280


 


Intake:   


 


  IV  1740 180


 


    Sodium Chloride 0.9% 1,  240 180





    000 ml @ 60 mls/hr IV .   





    [id number] [facility] Rx#:[id number]   


 


    Sodium Chloride 0.9% 1,  1000 





    000 ml @ 999 mls/hr IV .   





    Q1H1M [facility] Rx#:[id number]   


 


    Sodium Chloride 0.9% 500  500 





    ml @ 999 mls/hr IV .Q31M   





    ONE Rx#:[id number]   


 


  Intake, IV Titration   100





  Amount   


 


    Magnesium Sulfate-D5w Pmx   100





    1 gm In Dextrose/Water 1   





    100ml.bag @ 100 mls/hr   





    IVPB Q1H [facility] Rx#:   





    [id number]   


 


Output:   


 


  Urine  0 


 


Other:   


 


  Voiding Method  Urinal Urinal


 


  # Voids  1 


 


  # Bowel Movements 2 1 1


 


  Weight 97.069 kg 96.1 kg 














GENERAL EXAM: Alert, fairly comfortable in no apparent distress.


HEAD: Normocephalic.


EYES: Normal reaction of pupils, equal size.


NOSE: Clear with pink turbinates.


THROAT: There is crowding of the posterior pharynx.  No erythema or exudates.


NECK: No masses, no JVD.


CHEST: No chest wall deformity.


LUNGS: Equal air entry with no crackles, wheeze, rhonchi or dullness.


CVS: S1 and S2 normal with an audible murmur, irregular rhythm.


ABDOMEN: No hepatosplenomegaly, normal bowel sounds, no guarding or rigidity.


SPINE: No scoliosis or deformity


SKIN: No rashes


CENTRAL NERVOUS SYSTEM: No focal deficits, tone is normal in all 4 extremities.


Extremities: There is no significant peripheral edema.  No clubbing, no 

cyanosis.  Peripheral pulses are intact.





Results





- Laboratory Findings


CBC and BMP: 


 06/22/17 03:52





 06/22/17 03:52


PT/INR, D-dimer











PT  23.1 sec (9.0-12.0)  H  06/22/17  03:52    


 


INR  2.4  (<1.1)   06/22/17  03:52    








Abnormal lab findings: 


 Abnormal Labs











  06/21/17 06/21/17 06/22/17





  22:25 22:28 00:07


 


WBC  11.4 H  


 


RBC  4.26 L  


 


Hgb  10.9 L  


 


Hct  36.6 L  


 


MCHC  29.9 L  


 


RDW  16.5 H  


 


PT   27.2 H 


 


Chloride   


 


Carbon Dioxide   


 


POC Glucose (mg/dL)    108 H


 


Calcium   














  06/22/17 06/22/17 06/22/17





  03:52 03:52 03:52


 


WBC   


 


RBC  3.60 L  


 


Hgb  9.2 L D  


 


Hct  30.3 L  


 


MCHC  30.4 L  


 


RDW  16.3 H  


 


PT    23.1 H


 


Chloride   112 H 


 


Carbon Dioxide   20 L 


 


POC Glucose (mg/dL)   


 


Calcium   7.7 L 














Assessment and Plan


Plan: 





Impression:





#1 Acute gastrointestinal bleeding secondary to diverticulosis.  Current 

hemoglobin 9.2.  No transfusions required as far.





#2 Coagulopathy secondary to warfarin, did receive vitamin K 2 current INR 

2.4.  Levaquin discontinued.





#3 Coronary artery disease status post coronary artery bypass grafting in 2013, 

previous stent placements 3.





#4 Atrial fibrillation status post cardioversions and ablations.





#5 Severe ischemic cardiomyopathy with an ejection fraction less than 20%, 

status post AICD.





#6 Hyperlipidemia.





#7 Chronic obstructive pulmonary, currently inactive and stable.





#8 History of chronic tobacco dependence quit in 2013.





#9 Previous history of diverticulosis proximal a 5-6 years ago, recurrent 

bleeding approximately 1-1/2 years ago.





Plan:





The patient was seen and evaluated by Dr. Albrecht.  The patient's labs were 

reviewed.  His blood pressure does remain borderline.  We'll give him one more 

liter of fluid resuscitation.  He may require pressor support. Continue to 

monitor hemoglobin closely.  Tinea IV Protonix.  Await GI input.  He remains on 

Symbicort and DuoNeb's.  We'll continue to monitor him here in the intensive 

care unit.  





Critical care time 36 minutes.


Time with Patient: Greater than 30

## 2017-06-22 NOTE — PN
DATE OF SERVICE: 06/22/2017



This is a 66-year-old gentleman admitted with lower gastrointestinal 

bleeding, being closely monitored. Patient has possible diverticular 

bleed. The patient also has abdominal discomfort in the lower part of 

the abdomen. Hemoglobin is 8.5 mg at this time. The patient also has 

hypertension which is being closely monitored. Also multiple 

consultants are following the patient closely including GI. INR is 

still elevated at 2.4.  



PAST MEDICAL HISTORY: Reviewed. 



REVIEW OF SYSTEMS: 

CARDIOVASCULAR: No angina or palpitations. 

RESPIRATORY: As mentioned earlier. 

GI: No nausea. 

: No dysuria. 

NERVOUS SYSTEM: No numbness or weakness. 



Current medications are reviewed and include: 

1. DuoNeb q.i.d. and p.r.n. 

2. Zyloprim 200 mg p.o. b.i.d. 

4. Lasix 40 mg daily. 

5. Levaquin 500 mg IV daily. 

6. Toprol XL 25 mg daily. 

7. Mexiletine 150 mg every 8. 

8. Nitrostat 0.4 sublingual p.r.n. 

9. Zofran 4 mg every 6 hours p.r.n. 

10. Protonix 40 mg IV daily. 

11. IV fluids. 

12. Aldactone 25 mg p.o. b.i.d. 

13. Flomax 0.5 at bedtime. 



PHYSICAL EXAMINATION: 

GENERAL: The patient is alert, oriented x3. Pulse 77, blood pressure 

93/56, respirations 20, temperature 98.4, pulse ox 100% on room air.  

HEENT: Conjunctivae pale. Oral mucosa dry. 

NECK: No distention. No lymph node enlargement. Veins are collapsed. 

CARDIOVASCULAR: S1 and S2 muffled. 

LUNGS: Breath sounds diminished at the bases. Few scattered rhonchi. 

No crackles.  

ABDOMEN: Soft, obese, nontender. 

EXTREMITIES: Legs no edema, no swelling. 

NERVOUS SYSTEM: Higher functions as mentioned. Moves all 4 limbs. No 

focal deficits.  

LYMPH: No lymph node enlargement in the neck, axillae, groin. 

SKIN: No rashes. 



LABS: WBC 9.3, hemoglobin is 8.5, INR is 2.4. 



ASSESSMENT: 

1. Lower gastrointestinal bleeding with possible diverticular bleeding 

with acute blood loss anemia.  

2. Hypotension as well as hypotensive shock secondary to acute blood 

loss anemia and GI bleed.  

3. Increased WBC, possibly reactive. 

4. Anemia, normocytic. 

5. Coumadin monitoring. 

6. History of chronic obstructive pulmonary disease. 

7. History of deep venous thrombosis. 

8. History of gastroesophageal reflux disease. 

9. History of myocardial infarction. 

10. History of sleep apnea. 

11. History of degenerative joint disease. 

12. History of CPAP. 

13. History of automatic implantable cardioverter defibrillator. 

14. History of cardiac catheterization and ablation. 

15. History of cholecystectomy. 

16. History of coronary artery disease and coronary artery bypass 

grafting and stent.  

17. History of nicotine dependence. 

18. Obesity, body mass index of 30.6. 

19. FULL CODE. 



RECOMMENDATIONS/DISCUSSION: Continue with the current medications. 

Continue with monitoring and symptomatic treatment. Otherwise I would 

recommend monitoring hemoglobin closely.  Transfuse periodically. GI 

consultation. Hold Coumadin. Monitor PT and INR closely. Further 

recommendations to follow. Prognosis guarded.  



MTDD

## 2017-06-23 LAB
ANION GAP SERPL CALC-SCNC: 5 MMOL/L
BASOPHILS # BLD AUTO: 0 K/UL (ref 0–0.2)
BASOPHILS NFR BLD AUTO: 0 %
BUN SERPL-SCNC: 12 MG/DL (ref 9–20)
CALCIUM SPEC-MCNC: 7.5 MG/DL (ref 8.4–10.2)
CH: 25.2
CH: 26.2
CHCM: 30
CHCM: 30.6
CHLORIDE SERPL-SCNC: 114 MMOL/L (ref 98–107)
CO2 SERPL-SCNC: 20 MMOL/L (ref 22–30)
EOSINOPHIL # BLD AUTO: 0.3 K/UL (ref 0–0.7)
EOSINOPHIL NFR BLD AUTO: 4 %
ERYTHROCYTE [DISTWIDTH] IN BLOOD BY AUTOMATED COUNT: 2.69 M/UL (ref 4.3–5.9)
ERYTHROCYTE [DISTWIDTH] IN BLOOD BY AUTOMATED COUNT: 3.12 M/UL (ref 4.3–5.9)
ERYTHROCYTE [DISTWIDTH] IN BLOOD: 16.2 % (ref 11.5–15.5)
ERYTHROCYTE [DISTWIDTH] IN BLOOD: 16.3 % (ref 11.5–15.5)
GLUCOSE SERPL-MCNC: 79 MG/DL (ref 74–99)
HCT VFR BLD AUTO: 22.2 % (ref 39–53)
HCT VFR BLD AUTO: 27.2 % (ref 39–53)
HDW: 3
HDW: 3.17
HGB BLD-MCNC: 7.2 GM/DL (ref 13–17.5)
HGB BLD-MCNC: 8.5 GM/DL (ref 13–17.5)
INR PPP: 1.3 (ref ?–1.1)
LUC NFR BLD AUTO: 4 %
LYMPHOCYTES # SPEC AUTO: 1.3 K/UL (ref 1–4.8)
LYMPHOCYTES NFR SPEC AUTO: 17 %
MAGNESIUM SPEC-SCNC: 2 MG/DL (ref 1.6–2.3)
MCH RBC QN AUTO: 26.6 PG (ref 25–35)
MCH RBC QN AUTO: 27.1 PG (ref 25–35)
MCHC RBC AUTO-ENTMCNC: 31.1 G/DL (ref 31–37)
MCHC RBC AUTO-ENTMCNC: 32.2 G/DL (ref 31–37)
MCV RBC AUTO: 82.7 FL (ref 80–100)
MCV RBC AUTO: 87.2 FL (ref 80–100)
MONOCYTES # BLD AUTO: 0.3 K/UL (ref 0–1)
MONOCYTES NFR BLD AUTO: 4 %
NEUTROPHILS # BLD AUTO: 5.2 K/UL (ref 1.3–7.7)
NEUTROPHILS NFR BLD AUTO: 71 %
NON-AFRICAN AMERICAN GFR(MDRD): >60
POTASSIUM SERPL-SCNC: 4.2 MMOL/L (ref 3.5–5.1)
PT BLD: 12.5 SEC (ref 9–12)
SODIUM SERPL-SCNC: 139 MMOL/L (ref 137–145)
WBC # BLD AUTO: 0.27 10*3/UL
WBC # BLD AUTO: 7.4 K/UL (ref 3.8–10.6)
WBC # BLD AUTO: 8.8 K/UL (ref 3.8–10.6)
WBC (PEROX): 7.71

## 2017-06-23 PROCEDURE — 30233N1 TRANSFUSION OF NONAUTOLOGOUS RED BLOOD CELLS INTO PERIPHERAL VEIN, PERCUTANEOUS APPROACH: ICD-10-PCS

## 2017-06-23 RX ADMIN — METOPROLOL SUCCINATE SCH MG: 25 TABLET, EXTENDED RELEASE ORAL at 20:04

## 2017-06-23 RX ADMIN — IPRATROPIUM BROMIDE AND ALBUTEROL SULFATE SCH ML: .5; 3 SOLUTION RESPIRATORY (INHALATION) at 15:26

## 2017-06-23 RX ADMIN — IPRATROPIUM BROMIDE AND ALBUTEROL SULFATE SCH ML: .5; 3 SOLUTION RESPIRATORY (INHALATION) at 19:19

## 2017-06-23 RX ADMIN — ALLOPURINOL SCH MG: 100 TABLET ORAL at 20:05

## 2017-06-23 RX ADMIN — BUDESONIDE AND FORMOTEROL FUMARATE DIHYDRATE SCH PUFF: 80; 4.5 AEROSOL RESPIRATORY (INHALATION) at 19:19

## 2017-06-23 RX ADMIN — LEVOFLOXACIN SCH: 5 INJECTION, SOLUTION INTRAVENOUS at 08:42

## 2017-06-23 RX ADMIN — FUROSEMIDE SCH: 40 TABLET ORAL at 08:46

## 2017-06-23 RX ADMIN — IPRATROPIUM BROMIDE AND ALBUTEROL SULFATE SCH ML: .5; 3 SOLUTION RESPIRATORY (INHALATION) at 07:04

## 2017-06-23 RX ADMIN — IPRATROPIUM BROMIDE AND ALBUTEROL SULFATE SCH ML: .5; 3 SOLUTION RESPIRATORY (INHALATION) at 11:18

## 2017-06-23 RX ADMIN — MEXILETINE HYDROCHLORIDE SCH MG: 150 CAPSULE ORAL at 08:42

## 2017-06-23 RX ADMIN — MEXILETINE HYDROCHLORIDE SCH MG: 150 CAPSULE ORAL at 23:22

## 2017-06-23 RX ADMIN — CEFAZOLIN SCH MLS/HR: 330 INJECTION, POWDER, FOR SOLUTION INTRAMUSCULAR; INTRAVENOUS at 08:40

## 2017-06-23 RX ADMIN — PANTOPRAZOLE SODIUM SCH MG: 40 INJECTION, POWDER, FOR SOLUTION INTRAVENOUS at 08:42

## 2017-06-23 RX ADMIN — MEXILETINE HYDROCHLORIDE SCH MG: 150 CAPSULE ORAL at 14:44

## 2017-06-23 RX ADMIN — FUROSEMIDE SCH MG: 40 TABLET ORAL at 08:42

## 2017-06-23 RX ADMIN — TAMSULOSIN HYDROCHLORIDE SCH MG: 0.4 CAPSULE ORAL at 20:04

## 2017-06-23 RX ADMIN — ALLOPURINOL SCH MG: 100 TABLET ORAL at 08:41

## 2017-06-23 RX ADMIN — MEXILETINE HYDROCHLORIDE SCH MG: 150 CAPSULE ORAL at 01:21

## 2017-06-23 RX ADMIN — BUDESONIDE AND FORMOTEROL FUMARATE DIHYDRATE SCH PUFF: 80; 4.5 AEROSOL RESPIRATORY (INHALATION) at 07:04

## 2017-06-23 RX ADMIN — METOPROLOL SUCCINATE SCH MG: 25 TABLET, EXTENDED RELEASE ORAL at 08:41

## 2017-06-23 RX ADMIN — SPIRONOLACTONE SCH MG: 25 TABLET, FILM COATED ORAL at 08:47

## 2017-06-23 NOTE — P.PN
Subjective


Principal diagnosis: 





Gastrointestinal bleeding





This is a very pleasant 66-year-old woman who follows with Dr. Tapia in the 

[place] area.  He has a past history of chronic obstructive pulmonary 

disease treated with Advair and pro-air, former smoker quit in 2013, sleep 

apnea does not utilize CPAP, hyperlipidemia, benign prosthetic hypertrophy, 

myocardial infarctions 2, ischemic cardiomyopathy with ejection fraction less 

than 20%, status post AICD placement, ablations, status post coronary bypass 

surgery.  Has has a history of atrial fibrillation, PE/DVT, [place] filter 

placement he is anticoagulated with warfarin.  He does have a history of 

diverticulosis diagnosed proximal a 5-6 years ago and then had an episode of 

bleeding most recently 1-1/2 years ago.  2 evenings ago he started having 

painless dark stools and presented to [place] for the same.  A computed 

tomography scan of the abdomen did reveal evidence of diverticulosis without 

diverticulitis.  There is a stable distal abdominal aortic aneurysm.  Evidence 

of cholecystectomy.  He was subsequently transferred here directly to the 

selective care unit.  Once there he continued to have evidence of GI bleeding 

and hypotension and was subsequently transferred here to the intensive care 

unit.  He is seen today in consultation.  He is currently awake and alert in no 

acute distress.  His hemoglobin has drifted from 10.9-9.2 currently.  His 

initial INR was 2.8 currently 2.4 he did receive vitamin K.  He does have 

complaints of vague abdominal pain otherwise no other complaints.  No shortness 

of breath, cough or congestion.  No chest pain palpitations lightheadedness or 

dizziness.  His mean arterial pressure has remained 60-65.  Not currently on 

any pressors.  His 0.9 normal saline at 60 miles per hour.  Maintaining O2 

saturations in the 90s on room air.





The patient is seen again today 06/23/2017 in follow-up in the intensive care 

unit.  He remains awake and alert in no acute distress.  He's had one dark 

tarry stool in the past 24 hours.  He is maintaining hemoglobin at 7.2.  He has 

not required any blood transfusions thus far.  His INR has recovered currently 

at 1.2.  Denies any abdominal discomfort.  No shortness of breath, cough or 

congestion.  He is hemodynamically stable.  He did not require any pressor 

support.  Maintaining O2 saturations in the 90s on room air.





Objective





- Vital Signs


Vital signs: 


 Vital Signs











Temp  98.0 F   06/23/17 08:00


 


Pulse  78   06/23/17 09:00


 


Resp  19   06/23/17 09:00


 


BP  99/59   06/23/17 09:00


 


Pulse Ox  99   06/23/17 09:00








 Intake & Output











 06/22/17 06/23/17 06/23/17





 18:59 06:59 18:59


 


Intake Total 1760 720 180


 


Output Total 900 1050 850


 


Balance 860 -330 -670


 


Weight  96.8 kg 


 


Intake:   


 


  IV 1660 720 180


 


    Sodium Chloride 0.9% 1, 1660 720 180





    000 ml @ 60 mls/hr IV .   





    [id number] [facility] Rx#:[id number]   


 


  Intake, IV Titration 100  





  Amount   


 


    Magnesium Sulfate-D5w Pmx 100  





    1 gm In Dextrose/Water 1   





    100ml.bag @ 100 mls/hr   





    IVPB Q1H [facility] Rx#:   





    [id number]   


 


Output:   


 


  Urine 900 1050 850


 


Other:   


 


  Voiding Method Urinal Urinal Urinal


 


  # Voids 1 1 1


 


  # Bowel Movements 1 1 1














- Exam





GENERAL EXAM: Alert, fairly comfortable in no apparent distress.


HEAD: Normocephalic.


EYES: Normal reaction of pupils, equal size.


NOSE: Clear with pink turbinates.


THROAT: There is crowding of the posterior pharynx.  No erythema or exudates.


NECK: No masses, no JVD.


CHEST: No chest wall deformity.


LUNGS: Equal air entry with no crackles, wheeze, rhonchi or dullness.


CVS: S1 and S2 normal with an audible murmur, irregular rhythm.


ABDOMEN: No hepatosplenomegaly, normal bowel sounds, no guarding or rigidity.


SPINE: No scoliosis or deformity


SKIN: No rashes


CENTRAL NERVOUS SYSTEM: No focal deficits, tone is normal in all 4 extremities.


Extremities: There is no significant peripheral edema.  No clubbing, no 

cyanosis.  Peripheral pulses are intact.





- Labs


CBC & Chem 7: 


 06/23/17 04:22





 06/23/17 04:22


Labs: 


 Abnormal Lab Results - Last 24 Hours (Table)











  06/22/17 06/22/17 06/22/17 Range/Units





  10:03 16:20 22:06 


 


RBC  3.20 L  2.81 L  2.78 L  (4.30-5.90)  m/uL


 


Hgb  8.5 L  7.8 L  7.2 L  (13.0-17.5)  gm/dL


 


Hct  27.5 L  23.7 L  24.9 L  (39.0-53.0)  %


 


MCHC    28.9 L  (31.0-37.0)  g/dL


 


RDW  16.3 H  16.1 H  17.4 H  (11.5-15.5)  %


 


PT     (9.0-12.0)  sec


 


Chloride     ()  mmol/L


 


Carbon Dioxide     (22-30)  mmol/L


 


Calcium     (8.4-10.2)  mg/dL














  06/23/17 06/23/17 06/23/17 Range/Units





  04:22 04:22 04:22 


 


RBC  2.69 L    (4.30-5.90)  m/uL


 


Hgb  7.2 L    (13.0-17.5)  gm/dL


 


Hct  22.2 L    (39.0-53.0)  %


 


MCHC     (31.0-37.0)  g/dL


 


RDW  16.2 H    (11.5-15.5)  %


 


PT    12.5 H  (9.0-12.0)  sec


 


Chloride   114 H   ()  mmol/L


 


Carbon Dioxide   20 L   (22-30)  mmol/L


 


Calcium   7.5 L   (8.4-10.2)  mg/dL














Assessment and Plan


Plan: 





Impression:





#1 Acute gastrointestinal bleeding secondary to diverticulosis.  Current 

hemoglobin 7.2.  No transfusions required as far.





#2 Coagulopathy secondary to warfarin, did receive vitamin K 2 current INR 

1.2.  Levaquin discontinued.





#3 Coronary artery disease status post coronary artery bypass grafting in 2013, 

previous stent placements 3.





#4 Atrial fibrillation status post cardioversions and ablations.





#5 Severe ischemic cardiomyopathy with an ejection fraction less than 20%, 

status post AICD.





#6 Hyperlipidemia.





#7 Chronic obstructive pulmonary, currently inactive and stable.





#8 History of chronic tobacco dependence quit in 2013.





#9 Previous history of diverticulosis proximal a 5-6 years ago, recurrent 

bleeding approximately 1-1/2 years ago.





Plan:





The patient was seen and evaluated by Dr. Albrecht.  The patient's labs were 

reviewed.  He is currently stable from the pulmonary and critical care 

standpoint.  We will await GI services evaluation today.  The plan is for upper 

and lower endoscopies either today or tomorrow.  We'll continue to follow.  He 

could be transferred out of the intensive care unit later today if he remains 

stable.

## 2017-06-23 NOTE — PN
DATE OF SERVICE: 06/23/2017



This 66-year-old gentleman who was admitted with lower 

gastrointestinal bleeding, had possibly diverticular bleed.  The 

patient is noted to have hemoglobin 7.2. The patient also had relative 

hypotensive, 1 L transfusion was arranged for symptomatic anemia at 

this time.  Gastroenterology is planning endoscopies both upper and 

lower. 



Past medical history reviewed.   



REVIEW OF SYSTEMS: 

CARDIOVASCULAR: No angina or palpitations. 

RESPIRATORY: As mentioned earlier.

GASTROINTESTINAL: The patient complaining of mild lower abdominal 

discomfort which is slightly improving.  

: No dysuria. 



Current medications are:

1. DuoNeb q.i.d. and p.r.n. 

2. 200 mg p.o. b.i.d. 

3. Symbicort 80/4.5, 2 puffs b.i.d. 

4. Lasix 40 mg daily.

5. Levaquin 500 mg daily.

6. Toprol XL 25 mg p.o. b.i.d. 

8. Magnesium.

9. Nitrostat.

10. Zofran.

11. Protonix.

12. Aldactone.

13. Flomax. 



PHYSICAL EXAM: Patient is alert and oriented times three.  Pulse is 

77, blood pressure 120/50, respiratory  rate 20, temperature 97.2, 

pulse ox is 99% on BiPAP. 

HEENT: Conjunctivae pale.  Oral mucosa moist. 

NECK: No jugular venous distention. No carotid bruit. No lymph node 

enlargement.  

CARDIOVASCULAR: S1, S2 muffled.  

RESPIRATORY:  Breath sounds diminished at the bases.  A few scattered 

rhonchi.  

ABDOMEN: Soft, nontender. 

LEGS: No edema. No swelling. 

CENTRAL NERVOUS SYSTEM: Higher functions as mentioned earlier. Moves 

all four limbs. LYMPHATICS: No lymph nodes palpable in the neck, 

axillae or groin.   

SKIN: No ulcer, rash or bleeding. 



Labs at this time shows hemoglobin 7.2, INR is 1.3.



ASSESSMENT: 

1. Lower gastrointestinal bleeding with possible acute diverticular 

bleeding with acute blood loss anemia, symptomatic.  

2. Relative hypotension as well as hypertension shock secondary to 

blood loss anemia and gastrointestinal bleed.  

3. Increased WBC, possibly reactive. 

4. Anemia, normocytic. 

5. Coumadin monitoring. 

6. History of chronic obstructive pulmonary disease. 

7. History of deep venous thrombosis. 

8. History of gastroesophageal reflux disease.

9. History of myocardial infarction.

10. Sleep apnea, CPAP. 

11. History of degenerative joint disease .

12. History of CPAP.

13. History of automatic implantable cardioverter defibrillator. 

14. History of cardiac catheterization and ablation. 

15. History of cholecystectomy.

16. Coronary artery disease, coronary artery bypass grafting,  stent. 

17. History of nicotine dependence. 

18. Obesity, body mass index 30.6.

19. 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 

20. FULL CODE. 



RECOMMENDATIONS AND DISCUSSION: Recommend to continue current 

medications.  Continue with monitoring, symptomatic treatment.  

Otherwise, at this time, I recommend to continue with current 

medications, one unit of transfusion as mentioned earlier. Otherwise 

closely monitor.  Endoscopies by gastroenterology.   Prognosis 

guarded. Further recommendations to follow. The patient has a 

biventricular automatic implantable cardioverter defibrillator.   Once 

gastrointestinal bleed stabilizes recommend start Coumadin per 

cardiology.  Otherwise, currently prognosis guarded.  Further 

recommendations to follow.   



MTDD

## 2017-06-23 NOTE — P.PN
Subjective


Principal diagnosis: 





gi bleed


4-5 small burgundy-colored bowel movements yesterday none through the night.  

Feels well.  Hemoglobin 7.2.  INR 1.3.  Denies abdominal pain.








Objective





- Vital Signs


Vital signs: 


 Vital Signs











Temp  98.0 F   06/23/17 08:00


 


Pulse  78   06/23/17 09:00


 


Resp  19   06/23/17 09:00


 


BP  99/59   06/23/17 09:00


 


Pulse Ox  99   06/23/17 09:00








 Intake & Output











 06/22/17 06/23/17 06/23/17





 18:59 06:59 18:59


 


Intake Total 1760 720 180


 


Output Total 900 1050 850


 


Balance 860 330 670


 


Weight  96.8 kg 


 


Intake:   


 


  IV 1660 720 180


 


    Sodium Chloride 0.9% 1, 1660 720 180





    000 ml @ 60 mls/hr IV .   





    [id number] [facility] Rx#:[id number]   


 


  Intake, IV Titration 100  





  Amount   


 


    Magnesium Sulfate-D5w Pmx 100  





    1 gm In Dextrose/Water 1   





    100ml.bag @ 100 mls/hr   





    IVPB Q1H [facility] Rx#:   





    [id number]   


 


Output:   


 


  Urine 900 1050 850


 


Other:   


 


  Voiding Method Urinal Urinal Urinal


 


  # Voids 1 1 1


 


  # Bowel Movements 1 1 1














- Exam


General appearance: The patient is alert, oriented, in no acute distress.


HET: Head is normocephalic and atraumatic.  Pupils are equal and reactive.  

Oropharynx is clear without lesions.


Neck: Supple without lymphadenopathy.  Trachea midline.


Heart: S1 S2.  


Lungs: No crackles or wheezes are heard.


Abdomen: Soft, nontender, nondistended with  bowel sounds.  No peritoneal 

signs.  No palpable organomegaly or masses.


Extremities: Normal skin color and turgor.  No cyanosis, rash, ulceration, 

clubbing, or edema.  Radial and pedal pulses are 2/4 bilaterally.


Neurological: No focal deficits.  Strength and sensation are grossly intact.








- Labs


CBC & Chem 7: 


 06/23/17 04:22





 06/23/17 04:22


Labs: 


 Abnormal Lab Results - Last 24 Hours (Table)











  06/22/17 06/22/17 06/23/17 Range/Units





  16:20 22:06 04:22 


 


RBC  2.81 L  2.78 L  2.69 L  (4.30-5.90)  m/uL


 


Hgb  7.8 L  7.2 L  7.2 L  (13.0-17.5)  gm/dL


 


Hct  23.7 L  24.9 L  22.2 L  (39.0-53.0)  %


 


MCHC   28.9 L   (31.0-37.0)  g/dL


 


RDW  16.1 H  17.4 H  16.2 H  (11.5-15.5)  %


 


PT     (9.0-12.0)  sec


 


Chloride     ()  mmol/L


 


Carbon Dioxide     (22-30)  mmol/L


 


Calcium     (8.4-10.2)  mg/dL














  06/23/17 06/23/17 Range/Units





  04:22 04:22 


 


RBC    (4.30-5.90)  m/uL


 


Hgb    (13.0-17.5)  gm/dL


 


Hct    (39.0-53.0)  %


 


MCHC    (31.0-37.0)  g/dL


 


RDW    (11.5-15.5)  %


 


PT   12.5 H  (9.0-12.0)  sec


 


Chloride  114 H   ()  mmol/L


 


Carbon Dioxide  20 L   (22-30)  mmol/L


 


Calcium  7.5 L   (8.4-10.2)  mg/dL














Assessment and Plan


(1) Acute GI bleeding


Narrative/Plan: 


66-year-old gentleman with a history of significant cardiac history including 

atrial fibrillation with Coumadin monitoring, PE DVT, CAD MI with AICD CABG 

presents with acute lower GI bleed burgundy-colored bowel movements with 

abdominal pain and hypotension with history of underlying diverticulosis most 

likely exacerbated by Coumadin-induced coagulopathy.  Suspect bleeding could be 

related to diverticular bleeding however ischemic colitis needs to be kept 

within the differential.  Upper GI pathology cannot be entirely excluded.


Status: Acute   





(2) Acute blood loss anemia


Status: Acute   





(3) Warfarin-induced coagulopathy


Status: Acute   





(4) Symptomatic anemia


Status: Acute   


Plan: 


1. EGD/colonoscopy tomorrow.


2.  GI prophylaxis.  Monitor CBC.  Continue to hold anticoagulation.








The gastroenterologist has discussed the risks, benefits and alternative 

therapies for the above-mentioned procedure and for both sedation/analgesia as 

well as necessary blood product administration, if indicated, as they pertain 

to this patient.  The patient has indicated understanding and acceptance of the 

risks and procedures discussed.





Assessment and plan of care discussed with Dr. Anderson

## 2017-06-24 LAB
ANION GAP SERPL CALC-SCNC: 7 MMOL/L
BASOPHILS # BLD AUTO: 0 K/UL (ref 0–0.2)
BASOPHILS NFR BLD AUTO: 0 %
BASOPHILS NFR BLD AUTO: 1 %
BASOPHILS NFR BLD AUTO: 1 %
BUN SERPL-SCNC: 7 MG/DL (ref 9–20)
CALCIUM SPEC-MCNC: 7.9 MG/DL (ref 8.4–10.2)
CH: 26.2
CH: 26.3
CH: 26.4
CHCM: 30.2
CHCM: 30.6
CHCM: 30.7
CHLORIDE SERPL-SCNC: 110 MMOL/L (ref 98–107)
CO2 SERPL-SCNC: 20 MMOL/L (ref 22–30)
EOSINOPHIL # BLD AUTO: 0.2 K/UL (ref 0–0.7)
EOSINOPHIL # BLD AUTO: 0.3 K/UL (ref 0–0.7)
EOSINOPHIL # BLD AUTO: 0.3 K/UL (ref 0–0.7)
EOSINOPHIL NFR BLD AUTO: 3 %
ERYTHROCYTE [DISTWIDTH] IN BLOOD BY AUTOMATED COUNT: 2.77 M/UL (ref 4.3–5.9)
ERYTHROCYTE [DISTWIDTH] IN BLOOD BY AUTOMATED COUNT: 2.96 M/UL (ref 4.3–5.9)
ERYTHROCYTE [DISTWIDTH] IN BLOOD BY AUTOMATED COUNT: 3 M/UL (ref 4.3–5.9)
ERYTHROCYTE [DISTWIDTH] IN BLOOD: 16.6 % (ref 11.5–15.5)
ERYTHROCYTE [DISTWIDTH] IN BLOOD: 16.7 % (ref 11.5–15.5)
ERYTHROCYTE [DISTWIDTH] IN BLOOD: 17 % (ref 11.5–15.5)
GLUCOSE SERPL-MCNC: 83 MG/DL (ref 74–99)
HCT VFR BLD AUTO: 23.8 % (ref 39–53)
HCT VFR BLD AUTO: 25.8 % (ref 39–53)
HCT VFR BLD AUTO: 25.9 % (ref 39–53)
HDW: 3.21
HDW: 3.23
HDW: 3.27
HGB BLD-MCNC: 7.3 GM/DL (ref 13–17.5)
HGB BLD-MCNC: 7.8 GM/DL (ref 13–17.5)
HGB BLD-MCNC: 7.9 GM/DL (ref 13–17.5)
INR PPP: 1.2 (ref ?–1.1)
LUC NFR BLD AUTO: 3 %
LUC NFR BLD AUTO: 3 %
LUC NFR BLD AUTO: 4 %
LYMPHOCYTES # SPEC AUTO: 0.8 K/UL (ref 1–4.8)
LYMPHOCYTES # SPEC AUTO: 0.9 K/UL (ref 1–4.8)
LYMPHOCYTES # SPEC AUTO: 1.1 K/UL (ref 1–4.8)
LYMPHOCYTES NFR SPEC AUTO: 10 %
LYMPHOCYTES NFR SPEC AUTO: 10 %
LYMPHOCYTES NFR SPEC AUTO: 12 %
MAGNESIUM SPEC-SCNC: 1.8 MG/DL (ref 1.6–2.3)
MCH RBC QN AUTO: 26.2 PG (ref 25–35)
MCH RBC QN AUTO: 26.2 PG (ref 25–35)
MCH RBC QN AUTO: 26.3 PG (ref 25–35)
MCHC RBC AUTO-ENTMCNC: 30.3 G/DL (ref 31–37)
MCHC RBC AUTO-ENTMCNC: 30.3 G/DL (ref 31–37)
MCHC RBC AUTO-ENTMCNC: 30.5 G/DL (ref 31–37)
MCV RBC AUTO: 85.9 FL (ref 80–100)
MCV RBC AUTO: 86.3 FL (ref 80–100)
MCV RBC AUTO: 87 FL (ref 80–100)
MONOCYTES # BLD AUTO: 0.4 K/UL (ref 0–1)
MONOCYTES # BLD AUTO: 0.4 K/UL (ref 0–1)
MONOCYTES # BLD AUTO: 0.5 K/UL (ref 0–1)
MONOCYTES NFR BLD AUTO: 5 %
MONOCYTES NFR BLD AUTO: 5 %
MONOCYTES NFR BLD AUTO: 6 %
NEUTROPHILS # BLD AUTO: 5.9 K/UL (ref 1.3–7.7)
NEUTROPHILS # BLD AUTO: 6 K/UL (ref 1.3–7.7)
NEUTROPHILS # BLD AUTO: 8.3 K/UL (ref 1.3–7.7)
NEUTROPHILS NFR BLD AUTO: 77 %
NEUTROPHILS NFR BLD AUTO: 77 %
NEUTROPHILS NFR BLD AUTO: 79 %
NON-AFRICAN AMERICAN GFR(MDRD): >60
PHOSPHATE SERPL-MCNC: 2.3 MG/DL (ref 2.5–4.5)
POTASSIUM SERPL-SCNC: 3.9 MMOL/L (ref 3.5–5.1)
PT BLD: 11.8 SEC (ref 9–12)
SODIUM SERPL-SCNC: 137 MMOL/L (ref 137–145)
WBC # BLD AUTO: 0.23 10*3/UL
WBC # BLD AUTO: 0.3 10*3/UL
WBC # BLD AUTO: 0.32 10*3/UL
WBC # BLD AUTO: 10.4 K/UL (ref 3.8–10.6)
WBC # BLD AUTO: 7.7 K/UL (ref 3.8–10.6)
WBC # BLD AUTO: 7.8 K/UL (ref 3.8–10.6)
WBC (PEROX): 10.48
WBC (PEROX): 7.67
WBC (PEROX): 8.06

## 2017-06-24 PROCEDURE — 0DB68ZX EXCISION OF STOMACH, VIA NATURAL OR ARTIFICIAL OPENING ENDOSCOPIC, DIAGNOSTIC: ICD-10-PCS

## 2017-06-24 PROCEDURE — 0DJD8ZZ INSPECTION OF LOWER INTESTINAL TRACT, VIA NATURAL OR ARTIFICIAL OPENING ENDOSCOPIC: ICD-10-PCS

## 2017-06-24 RX ADMIN — ALLOPURINOL SCH: 100 TABLET ORAL at 11:27

## 2017-06-24 RX ADMIN — TAMSULOSIN HYDROCHLORIDE SCH MG: 0.4 CAPSULE ORAL at 20:09

## 2017-06-24 RX ADMIN — MEXILETINE HYDROCHLORIDE SCH MG: 150 CAPSULE ORAL at 22:54

## 2017-06-24 RX ADMIN — SODIUM CHLORIDE SCH MLS/HR: 9 INJECTION, SOLUTION INTRAVENOUS at 08:06

## 2017-06-24 RX ADMIN — IPRATROPIUM BROMIDE AND ALBUTEROL SULFATE SCH ML: .5; 3 SOLUTION RESPIRATORY (INHALATION) at 12:15

## 2017-06-24 RX ADMIN — METOPROLOL SUCCINATE SCH MG: 25 TABLET, EXTENDED RELEASE ORAL at 11:27

## 2017-06-24 RX ADMIN — MEXILETINE HYDROCHLORIDE SCH MG: 150 CAPSULE ORAL at 15:58

## 2017-06-24 RX ADMIN — MAGNESIUM SULFATE IN DEXTROSE SCH MLS/HR: 10 INJECTION, SOLUTION INTRAVENOUS at 09:15

## 2017-06-24 RX ADMIN — SODIUM CHLORIDE SCH MLS/HR: 9 INJECTION, SOLUTION INTRAVENOUS at 07:03

## 2017-06-24 RX ADMIN — MAGNESIUM SULFATE IN DEXTROSE SCH MLS/HR: 10 INJECTION, SOLUTION INTRAVENOUS at 07:03

## 2017-06-24 RX ADMIN — IPRATROPIUM BROMIDE AND ALBUTEROL SULFATE SCH ML: .5; 3 SOLUTION RESPIRATORY (INHALATION) at 18:48

## 2017-06-24 RX ADMIN — SPIRONOLACTONE SCH MG: 25 TABLET, FILM COATED ORAL at 15:59

## 2017-06-24 RX ADMIN — MEXILETINE HYDROCHLORIDE SCH MG: 150 CAPSULE ORAL at 08:05

## 2017-06-24 RX ADMIN — IPRATROPIUM BROMIDE AND ALBUTEROL SULFATE SCH ML: .5; 3 SOLUTION RESPIRATORY (INHALATION) at 07:47

## 2017-06-24 RX ADMIN — METOPROLOL SUCCINATE SCH MG: 25 TABLET, EXTENDED RELEASE ORAL at 20:09

## 2017-06-24 RX ADMIN — ALLOPURINOL SCH MG: 100 TABLET ORAL at 20:08

## 2017-06-24 RX ADMIN — LEVOFLOXACIN SCH MLS/HR: 5 INJECTION, SOLUTION INTRAVENOUS at 08:00

## 2017-06-24 RX ADMIN — PANTOPRAZOLE SODIUM SCH MG: 40 INJECTION, POWDER, FOR SOLUTION INTRAVENOUS at 08:00

## 2017-06-24 RX ADMIN — BUDESONIDE AND FORMOTEROL FUMARATE DIHYDRATE SCH PUFF: 80; 4.5 AEROSOL RESPIRATORY (INHALATION) at 07:46

## 2017-06-24 RX ADMIN — CEFAZOLIN SCH: 330 INJECTION, POWDER, FOR SOLUTION INTRAMUSCULAR; INTRAVENOUS at 06:53

## 2017-06-24 RX ADMIN — FUROSEMIDE SCH MG: 40 TABLET ORAL at 15:58

## 2017-06-24 RX ADMIN — BUDESONIDE AND FORMOTEROL FUMARATE DIHYDRATE SCH PUFF: 80; 4.5 AEROSOL RESPIRATORY (INHALATION) at 18:49

## 2017-06-24 RX ADMIN — IPRATROPIUM BROMIDE AND ALBUTEROL SULFATE SCH: .5; 3 SOLUTION RESPIRATORY (INHALATION) at 14:58

## 2017-06-24 NOTE — PN
This patient has a history of cardiomyopathy. Patient came with 

gastrointestinal bleeding and underwent upper GI and lower GI 

endoscopy. No significant abnormality was detected. Patient remained 

stable. Denies any chest pain. Denies any shortness of breath.  



Heart rate is 77 per minute, blood pressure is 97/59 mmHg. First and 

second heart sounds are normal. Lungs are clinically clear to 

auscultation and percussion.  



RECOMMENDATION: Continue current medications. Patient's Entresto is on 

hold because of the low blood pressure. After the discharge from the 

patient Entresto can be restarted when his blood pressure is 

stabilized.

## 2017-06-24 NOTE — P.PCN
Date of Procedure: 06/24/17


Preoperative Diagnosis: 





Postoperative Diagnosis: 





Procedure(s) Performed: 


Brief history:


Patient is a pleasant 66-year-old white male, admitted to the hospital with 

acute GI bleed.  He had multiple episodes of burgundy-colored stools associated 

with cramping left lower quadrant abdominal pain for the last 2 days' duration.

  He is hence scheduled for an upper endoscopy as well as colonoscopy as a part 

of evaluation of acute GI bleed.





Procedure performed:


Esophagogastroduodenoscopy with biopsy


Colonoscopy





Preoperative diagnosis:


Acute GI bleed


Abdominal pain





Anesthesia: MAC





Procedure:


After informed consent was obtained from the patient  was brought into the 

endoscopy unit and IV  sedation was administered by anesthesia under continuous 

monitoring.  Initially upper endoscopy was done.  The Olympus  video 

endoscope was inserted inserted into the mouth and esophagus intubated without 

any difficulty and was gradually advanced into the stomach and duodenum and 

carefully examined.  The bulb and second part of the duodenum appeared normal.  

The scope was then withdrawn into the stomach adequately insufflated with air 

and upon careful examination  the antrum had scattered erosions but no active 

bleeding and biopsies were done from this area.  The  body, cardia and fundus 

appeared normal.  The scope was then withdrawn into the esophagus.  The GE 

junction was located at 40 cm to the incisors.  It appeared regular with no 

erythema erosions or ulcerations.  Rest of the esophagus appeared normal.  

Patient tolerated the procedure well.





At this time the patient continued to remain sedation.  Initial digital rectal 

examination was normal.  Olympus  video colonoscope was then inserted 

into the rectum and gradually advanced to the cecum without any difficulty.  

Careful examination was performed as the scope was gradually being withdrawn.  

The prep was excellent.  The cecum, ascending colon, transverse colon, 

descending colon, sigmoid colon and rectum appeared normal. moderate ascites 

diverticulosis seen.  No active bleeding noted.  Retroflexion was performed in 

the rectum and  small internal hemorrhoidswere noted.  Patient tolerated the 

procedure well.





Impression:


1. Upper endoscopy revealed antral erosive gastritis but no evidence of upper 

GI bleed


2. Colonoscopy revealed moderate left-sided diverticulosis and small internal 

hemorrhoids.  No active bleeding seen.








Recommendations:


Findings of this examination were discussed with the patient .  Most likely her 

recent episode of bleed was diverticular in nature which has spontaneously 

resolved.  He'll be started on a soft diet.


Implants: 





Indications for Procedure: 





Operative Findings: 





Description of Procedure:

## 2017-06-24 NOTE — XR
EXAMINATION TYPE: XR chest 1V portable

 

DATE OF EXAM: 6/24/2017

History chest pain and short of breath.

Comparison 4/20/2016.

 

Technique single view.

 

FINDINGS:

Heart is enlarged. There is no gross heart failure. There is coarsening of interstitial markings. The
re is a left axillary pacemaker with the lead tips in the right ventricle. I see no pleural effusion.
 There are chest leads.

 

CONCLUSION:

Cardiomegaly. No heart failure. Pulmonary fibrotic changes. No significant change compared to old exa
m.

## 2017-06-24 NOTE — PN
DATE OF SERVICE:  06/24/2017



This 66 -year-old gentleman admitted with lower gastrointestinal 

bleeding and acute blood loss anemia, received one transfusion. 

Hemoglobin went up to 8.5 from 7.2 but today again it is 7.3.   No 

active bleeding reported. The patient also underwent EGD and 

colonoscopy by Dr. Anderson.  EGD showed  antral erosive gastritis and 

colonoscopy showed diverticulosis without any active stigmata of 

bleeding, however,  Dr. Anderson believes the bleeding could be related 

to diverticular disease.  



Past medical history reviewed. 



REVIEW OF SYSTEMS: 

CARDIOVASCULAR: No angina or palpitations. 

RESPIRATORY SYSTEM: As mentioned earlier. 

GASTROINTESTINAL: As mentioned earlier.

: No dysuria. Nervous system: As mentioned earlier.



Current medications are reviewed and include: 

1. Duoneb q.i.d. and p.r.n. 

2. Zyloprim 200 mg p.o. b.i.d. 

3. Symbicort 80/4.5, 2 puffs b.i.d. 

4. Lasix 40 mg daily.

5. Levaquin 500 mg daily. 

6. Toprol XL 25 mg daily. 

7. Mexiletine 150 q8h. 

8. Nitrostat 0.4 sublingual p.r.n.  

9. Zofran 4 mg IV q.6. 

10. Protonix 40 mg daily.

11. Aldactone 25 milligrams.

12. Flomax 0.4 at bedtime. 



PHYSICAL EXAMINATION: Patient is alert and oriented x3. Pulse 75.  

Blood pressure 97/59.  Respiratory  rate 16.  Temperature normal. 

Pulse ox 97% on room air.  

HEENT: Conjunctivae normal.  Oral mucosa moist. 

NECK: No JVD. No carotid bruit.  No lymph node enlargement. 

CARDIOVASCULAR: S1, S2 muffled.  No S3, no S4. 

RESPIRATORY: Breath sounds diminished at the bases. A few scattered 

rhonchi, no crackles.  

ABDOMEN: Soft, obese, nontender. No mass palpable.   No guarding or 

rigidity.   

LEGS: No edema. No swelling.  

CENTRAL NERVOUS SYSTEM: Higher functions as mentioned earlier.  Moves 

all four limbs. No focal deficits.  

LYMPHATICS: No lymph nodes palpable in the neck, axillae or groin.  

SKIN: No ulcer, rash or bleeding. 



LABS: Hemoglobin 7.3.  Otherwise, CO2 is 20 and  is 2.3. 



ASSESSMENT:

1. Lower gastrointestinal bleeding, possible acute diverticular 

bleeding with acute blood loss anemia, symptomatic.  

2. Status post EGD and colonoscopy showing antral erosive gastritis 

and significant diverticulosis without any stigmata of any recent 

gastrointestinal bleed.  

3. Relative hypotension as well as hypotensive shock secondary to 

acute blood loss anemia and gastrointestinal bleed, present on 

admission.  

4. Increased WBC, possibly reactive. 

5. Anemia, normocytic. 

6. Coumadin monitoring. 

7. History of chronic obstructive pulmonary disease. 

8. History of deep venous thrombosis. 

9. History of gastroesophageal reflux disease. 

10. History of myocardial infarction.

11. History of sleep apnea, CPAP. 

12. History of degenerative joint disease.

13. History of automatic implantable cardioverter defibrillator. 

14. History of cardiac catheterization ablation. 

15. History of cholecystectomy. 

16. History of coronary artery disease, coronary artery bypass 

grafting and stent.  

17. History of nicotine dependence. 

18. Obesity, body mass index 30.6. 

19. 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 

20. FULL CODE.



RECOMMENDATIONS AND DISCUSSION: In this 66 -year-old gentleman who 

presented with multiple complex medical issues. We will continue the 

current medications, continue with symptomatic treatment.  Avoid 

Coumadin for now.  Monitor hemoglobin closely which has dropped to 

7.3.  If it drops further or the patient is symptomatic we will 

recommend additional transfusion as well. Prognosis guarded.  Further 

recommendations to follow.  Portable chest was also being recommended 

to assess fluid status at this time.  



[facility]

## 2017-06-24 NOTE — P.PN
Subjective


Principal diagnosis: 





Gastrointestinal bleeding





This is a very pleasant 66-year-old woman who follows with Dr. Tapia in the 

[place] area.  He has a past history of chronic obstructive pulmonary 

disease treated with Advair and pro-air, former smoker quit in 2013, sleep 

apnea does not utilize CPAP, hyperlipidemia, benign prosthetic hypertrophy, 

myocardial infarctions 2, ischemic cardiomyopathy with ejection fraction less 

than 20%, status post AICD placement, ablations, status post coronary bypass 

surgery.  Has has a history of atrial fibrillation, PE/DVT, Delta filter 

placement he is anticoagulated with warfarin.  He does have a history of 

diverticulosis diagnosed proximal a 5-6 years ago and then had an episode of 

bleeding most recently 1-1/2 years ago.  2 evenings ago he started having 

painless dark stools and presented to [facility] for the same.  A computed 

tomography scan of the abdomen did reveal evidence of diverticulosis without 

diverticulitis.  There is a stable distal abdominal aortic aneurysm.  Evidence 

of cholecystectomy.  He was subsequently transferred here directly to the 

selective care unit.  Once there he continued to have evidence of GI bleeding 

and hypotension and was subsequently transferred here to the intensive care 

unit.  He is seen today in consultation.  He is currently awake and alert in no 

acute distress.  His hemoglobin has drifted from 10.9-9.2 currently.  His 

initial INR was 2.8 currently 2.4 he did receive vitamin K.  He does have 

complaints of vague abdominal pain otherwise no other complaints.  No shortness 

of breath, cough or congestion.  No chest pain palpitations lightheadedness or 

dizziness.  His mean arterial pressure has remained 60-65.  Not currently on 

any pressors.  His 0.9 normal saline at 60 miles per hour.  Maintaining O2 

saturations in the 90s on room air.





The patient is seen again today 06/23/2017 in follow-up in the intensive care 

unit.  He remains awake and alert in no acute distress.  He's had one dark 

tarry stool in the past 24 hours.  He is maintaining hemoglobin at 7.2.  He has 

not required any blood transfusions thus far.  His INR has recovered currently 

at 1.2.  Denies any abdominal discomfort.  No shortness of breath, cough or 

congestion.  He is hemodynamically stable.  He did not require any pressor 

support.  Maintaining O2 saturations in the 90s on room air.





The patient is seen again today 06/24/2017 in follow-up in the intensive care 

unit.  He is awake and alert in no acute distress.  He's not had any further 

bleeding.  The plan is for EGD/colonoscopy this morning.  His current 

hemoglobin is 7.3 he's had 1 unit of packed red blood cells in total since 

admission.  He denies any shortness of breath, cough or congestion.  No chest 

pain palpitations lightheadedness or dizziness.  No abdominal discomfort.





Objective





- Vital Signs


Vital signs: 


 Vital Signs











Temp  98.3 F   06/24/17 08:00


 


Pulse  66   06/24/17 10:00


 


Resp  19   06/24/17 10:00


 


BP  96/60   06/24/17 10:00


 


Pulse Ox  100   06/24/17 10:00








 Intake & Output











 06/23/17 06/24/17 06/24/17





 18:59 06:59 18:59


 


Intake Total 1340 1920 520


 


Output Total 1800 2002 300


 


Balance -460 -82 220


 


Weight  100.6 kg 


 


Intake:   


 


  IV 1030 720 520


 


    Magnesium Sulfate-D5w Pmx   200





    1 gm In Dextrose/Water 1   





    100ml.bag @ 100 mls/hr   





    IVPB Q1H [facility] Rx#:   





    [id number]   


 


    PRBCs 310 60 


 


    Potassium Chloride 10 meq   200





    Lidocaine 2% Inj 10 mg   





    In Sodium Chloride 0.9%   





    100 ml @ 100 mls/hr IV   





    Q1HR [facility] Rx#:[id number]   


 


    Sodium Chloride 0.9% 1, 720 660 120





    000 ml @ 60 mls/hr IV .   





    [id number] [facility] Rx#:[id number]   


 


  Oral  1200 


 


  Blood Product 310  


 


    Rc As-1  Unit 310  





    [id number]   


 


Output:   


 


  Urine 1800 2000 300


 


  Stool  2 


 


Other:   


 


  Voiding Method Urinal Urinal Urinal


 


  # Voids 1 1 


 


  # Bowel Movements 1 1 1














- Exam





GENERAL EXAM: Alert, fairly comfortable in no apparent distress.


HEAD: Normocephalic.


EYES: Normal reaction of pupils, equal size.


NOSE: Clear with pink turbinates.


THROAT: There is crowding of the posterior pharynx.  No erythema or exudates.


NECK: No masses, no JVD.


CHEST: No chest wall deformity.


LUNGS: Equal air entry with no crackles, wheeze, rhonchi or dullness.


CVS: S1 and S2 normal with an audible murmur, irregular rhythm.


ABDOMEN: No hepatosplenomegaly, normal bowel sounds, no guarding or rigidity.


SPINE: No scoliosis or deformity


SKIN: No rashes


CENTRAL NERVOUS SYSTEM: No focal deficits, tone is normal in all 4 extremities.


Extremities: There is no significant peripheral edema.  No clubbing, no 

cyanosis.  Peripheral pulses are intact.





- Labs


CBC & Chem 7: 


 06/24/17 04:09





 06/24/17 04:09


Labs: 


 Abnormal Lab Results - Last 24 Hours (Table)











  06/22/17 06/23/17 06/24/17 Range/Units





  05:19 19:26 04:09 


 


RBC   3.12 L   (4.30-5.90)  m/uL


 


Hgb   8.5 L   (13.0-17.5)  gm/dL


 


Hct   27.2 L   (39.0-53.0)  %


 


MCHC     (31.0-37.0)  g/dL


 


RDW   16.3 H   (11.5-15.5)  %


 


Lymphocytes #     (1.0-4.8)  k/uL


 


Chloride    110 H  ()  mmol/L


 


Carbon Dioxide    20 L  (22-30)  mmol/L


 


BUN    7 L  (9-20)  mg/dL


 


Calcium    7.9 L  (8.4-10.2)  mg/dL


 


Phosphorus    2.3 L  (2.5-4.5)  mg/dL


 


Crossmatch  See Detail    














  06/24/17 Range/Units





  04:09 


 


RBC  2.77 L  (4.30-5.90)  m/uL


 


Hgb  7.3 L  (13.0-17.5)  gm/dL


 


Hct  23.8 L  (39.0-53.0)  %


 


MCHC  30.5 L  (31.0-37.0)  g/dL


 


RDW  16.6 H  (11.5-15.5)  %


 


Lymphocytes #  0.9 L  (1.0-4.8)  k/uL


 


Chloride   ()  mmol/L


 


Carbon Dioxide   (22-30)  mmol/L


 


BUN   (9-20)  mg/dL


 


Calcium   (8.4-10.2)  mg/dL


 


Phosphorus   (2.5-4.5)  mg/dL


 


Crossmatch   














Assessment and Plan


Plan: 





Impression:





#1 Acute gastrointestinal bleeding secondary to diverticulosis.  Current 

hemoglobin 7.3.  1 unit of packed red blood cells this admission.





#2 Coagulopathy secondary to warfarin, did receive vitamin K 2 current INR 

1.2. 





#3 Coronary artery disease status post coronary artery bypass grafting in 2013, 

previous stent placements 3.





#4 Atrial fibrillation status post cardioversions and ablations.





#5 Severe ischemic cardiomyopathy with an ejection fraction less than 20%, 

status post AICD.





#6 Hyperlipidemia.





#7 Chronic obstructive pulmonary, currently inactive and stable.





#8 History of chronic tobacco dependence quit in 2013.





#9 Previous history of diverticulosis proximal a 5-6 years ago, recurrent 

bleeding approximately 1-1/2 years ago.





Plan:





The patient was seen and evaluated by Dr. Albrecht.  The patient's labs were 

reviewed.  He is currently stable from the pulmonary and critical care 

standpoint.   The plan is for upper and lower endoscopies today.  He will be 

transferred out of the intensive care unit following his procedures.  We'll 

continue to monitor his hemoglobin.  We'll continue to follow.

## 2017-06-25 VITALS — DIASTOLIC BLOOD PRESSURE: 65 MMHG | SYSTOLIC BLOOD PRESSURE: 115 MMHG | HEART RATE: 81 BPM

## 2017-06-25 VITALS — TEMPERATURE: 98.7 F | RESPIRATION RATE: 16 BRPM

## 2017-06-25 LAB
ANION GAP SERPL CALC-SCNC: 8 MMOL/L
BASOPHILS # BLD AUTO: 0 K/UL (ref 0–0.2)
BASOPHILS NFR BLD AUTO: 0 %
BUN SERPL-SCNC: 8 MG/DL (ref 9–20)
CALCIUM SPEC-MCNC: 8.4 MG/DL (ref 8.4–10.2)
CH: 26.7
CHCM: 31.2
CHLORIDE SERPL-SCNC: 105 MMOL/L (ref 98–107)
CO2 SERPL-SCNC: 23 MMOL/L (ref 22–30)
EOSINOPHIL # BLD AUTO: 0.2 K/UL (ref 0–0.7)
EOSINOPHIL NFR BLD AUTO: 3 %
ERYTHROCYTE [DISTWIDTH] IN BLOOD BY AUTOMATED COUNT: 3.02 M/UL (ref 4.3–5.9)
ERYTHROCYTE [DISTWIDTH] IN BLOOD: 17.2 % (ref 11.5–15.5)
GLUCOSE SERPL-MCNC: 98 MG/DL (ref 74–99)
HCT VFR BLD AUTO: 25.9 % (ref 39–53)
HDW: 3.18
HGB BLD-MCNC: 8 GM/DL (ref 13–17.5)
INR PPP: 1.2 (ref ?–1.1)
LUC NFR BLD AUTO: 4 %
LYMPHOCYTES # SPEC AUTO: 1 K/UL (ref 1–4.8)
LYMPHOCYTES NFR SPEC AUTO: 11 %
MAGNESIUM SPEC-SCNC: 1.8 MG/DL (ref 1.6–2.3)
MCH RBC QN AUTO: 26.4 PG (ref 25–35)
MCHC RBC AUTO-ENTMCNC: 30.7 G/DL (ref 31–37)
MCV RBC AUTO: 85.9 FL (ref 80–100)
MONOCYTES # BLD AUTO: 0.6 K/UL (ref 0–1)
MONOCYTES NFR BLD AUTO: 6 %
NEUTROPHILS # BLD AUTO: 6.8 K/UL (ref 1.3–7.7)
NEUTROPHILS NFR BLD AUTO: 77 %
NON-AFRICAN AMERICAN GFR(MDRD): >60
PHOSPHATE SERPL-MCNC: 2.7 MG/DL (ref 2.5–4.5)
POTASSIUM SERPL-SCNC: 3.9 MMOL/L (ref 3.5–5.1)
PT BLD: 11.6 SEC (ref 9–12)
SODIUM SERPL-SCNC: 136 MMOL/L (ref 137–145)
WBC # BLD AUTO: 0.31 10*3/UL
WBC # BLD AUTO: 8.9 K/UL (ref 3.8–10.6)
WBC (PEROX): 9.46

## 2017-06-25 RX ADMIN — LEVOFLOXACIN SCH: 5 INJECTION, SOLUTION INTRAVENOUS at 08:39

## 2017-06-25 RX ADMIN — SPIRONOLACTONE SCH: 25 TABLET, FILM COATED ORAL at 08:39

## 2017-06-25 RX ADMIN — IPRATROPIUM BROMIDE AND ALBUTEROL SULFATE SCH: .5; 3 SOLUTION RESPIRATORY (INHALATION) at 08:36

## 2017-06-25 RX ADMIN — IPRATROPIUM BROMIDE AND ALBUTEROL SULFATE SCH: .5; 3 SOLUTION RESPIRATORY (INHALATION) at 11:00

## 2017-06-25 RX ADMIN — ALLOPURINOL SCH: 100 TABLET ORAL at 08:38

## 2017-06-25 RX ADMIN — MEXILETINE HYDROCHLORIDE SCH: 150 CAPSULE ORAL at 08:38

## 2017-06-25 RX ADMIN — MEXILETINE HYDROCHLORIDE SCH: 150 CAPSULE ORAL at 16:44

## 2017-06-25 RX ADMIN — METOPROLOL SUCCINATE SCH: 25 TABLET, EXTENDED RELEASE ORAL at 08:39

## 2017-06-25 RX ADMIN — BUDESONIDE AND FORMOTEROL FUMARATE DIHYDRATE SCH: 80; 4.5 AEROSOL RESPIRATORY (INHALATION) at 08:36

## 2017-06-25 RX ADMIN — PANTOPRAZOLE SODIUM SCH: 40 INJECTION, POWDER, FOR SOLUTION INTRAVENOUS at 08:39

## 2017-06-25 RX ADMIN — FUROSEMIDE SCH: 40 TABLET ORAL at 08:38

## 2017-06-25 NOTE — PN
This is a  66 who was in the ICU up until yesterday. He was 

transferred out of the ICU after an EGD and colonoscopy. The patient 

is doing relatively well. The patient may be discharged home today or 

tomorrow. His hemoglobin has been holding stable. He did receive 1 

unit of PRBCs. Most recent chest x-ray did not show anything acute.  

His procedure showed evidence of erosive gastritis involving the 

antrum of the stomach, but there was no evidence of acute or active 

upper GI bleed. Colonoscopy revealed moderate left-sided 

diverticulosis small internal hemorrhoids. No active bleeding.  



Currently, vital signs include temperature 97, heart rate 81, 

respirations 16, blood pressure 115/65, mean 81, room air saturation 

96%. The patient appears in no acute distress. HEENT examination is 

grossly unremarkable. Mucous membranes are moist. No oral lesions. 

Neck is supple. Full range of motion. No adenopathy or thyromegaly. 

Neck veins are flat. Cardiovascular examination reveals regular rhythm 

and rate. S1, S2 normal. No murmur noted. Lungs reveal clear breath 

sounds. No wheezes or rhonchi.  

ABDOMEN: Soft. Bowel sounds are heard. No masses or tenderness. 

EXTREMITIES: Intact. No cyanosis, clubbing, or edema. Skin is without 

rash.  



ASSESSMENT:

1. Acute gastrointestinal bleed, likely secondary to antral gastritis 

and/or diverticulosis. Those were of the findings on EGD and 

colonoscopy.  

2. Status post 1 unit of PRBCs for his symptomatic anemia. 

3. Coumadin-induced coagulopathy. 

4. Coronary artery disease, status post bypass grafting. 

5. History of chronic atrial fibrillation, stable. 

6. Severe ischemic cardiomyopathy with an ejection fraction of less 

than 20%, status post AICD.  

7. Hyperlipidemia. 

8. Chronic obstructive pulmonary disease, stable. 

9. History of chronic tobacco use. 

10. Previous history of diverticulosis. 



PLAN: The patient is doing well. There was no acute or active bleeding 

on EGD or colonoscopy.  



Findings were consistent with diverticular disease as well antral 

gastritis with erosions. There was no active bleeding. The patient 

seemed to be relatively stable. Chest x-ray is stable. Today's lab 

data shows a white count of 8.9, hemoglobin 8, hematocrit 25.9, 

platelet count 194,000. PT, INR normal.  Electrolytes are relatively 

normal. Sodium 136, BUN and creatinine were 8 and 1.20. We will 

continue to follow.

## 2017-06-25 NOTE — PN
Patient is a 66-year-old pleasant, white male admitted to the hospital 

with acute GI bleed. He underwent an upper endoscopy as well as 

colonoscopy yesterday which showed antral erosive gastritis and 

left-sided diverticulosis. No active bleeding noted at the time of 

examination. He was started on a soft diet. Doing well this morning. 

He denies any complaints. No further episodes of bleeding. He denies 

any abdominal pain. Reports no nausea or vomiting.  



On physical examination, appears comfortable in no apparent distress. 

Vital signs are stable. Blood pressure 132/86, pulse rate 86 per 

minute and afebrile. HEENT examination unremarkable. Conjunctivae 

pink. Sclerae anicteric. Oral cavity, no lesions.  

NECK: No JVD or lymph node enlargement. 

Chest was clear to auscultation. 

HEART: Regular rate and rhythm. 

ABDOMEN: Soft. Bowel sounds are positive. No organomegaly. 

EXTREMITIES: No pedal edema. 

SKIN: No rashes. 

NEURO: Alert and oriented x3. No focal deficits.



Labs from today, hemoglobin 8 g/dL. 



IMPRESSION: 

Acute gastrointestinal bleed possibly diverticular in etiology, status 

post esophagogastroduodenoscopy and colonoscopy yesterday that showed 

antral erosive gastritis and left-sided diverticulosis. Bleeding 

resolved spontaneously. Patient hemodynamically stable and last 

hemoglobin 8 g/dL.  



RECOMMENDATIONS: 

1. Continue with the soft diet. 

2. He can be discharged home today with an outpatient follow up in 2 

weeks.

## 2017-06-25 NOTE — PN
Patient is a 66-year-old pleasant white male admitted to the hospital 

with acute GI bleed. He underwent an upper endoscopy as well as 

colonoscopy yesterday that showed evidence of antral erosive gastritis 

and sigmoid diverticulosis with no active bleeding. Today, he is doing 

better. No further episodes of bleeding. No abdominal pain. No nausea, 

vomiting. Soft diet, tolerating well.  



On physical examination, appears comfortable in no apparent distress. 

Vital signs stable. Blood pressure is 97/82, pulse 82 per minute and 

afebrile. HEENT examination unremarkable. Conjunctivae pink. Sclerae 

anicteric. Oral cavity, no lesions.  

NECK: No JVD or lymph node enlargement.

CHEST: Clear to auscultation. 

HEART: Regular rate and rhythm. 

ABDOMEN: Soft. Bowel sounds are positive. No organomegaly. 

EXTREMITIES: No pedal edema. 

SKIN: No rashes. 

NEURO: Alert and oriented x3. No focal deficits. 



LABS: Hemoglobin 8, WBC 8.9, platelets 194, INR is 1.2. 



IMPRESSION: 

Acute gastrointestinal bleed, possibly diverticular in etiology, 

status post esophagogastroduodenoscopy and colonoscopy yesterday that 

showed evidence of antral erosive gastritis and left-sided 

diverticulosis with no further bleeding.  



RECOMMENDATIONS: 

1. Continue with a soft diet. 

2. He can be discharged home today with an outpatient follow up in 2 

to 3 weeks.

## 2017-06-25 NOTE — DS
DATE OF ADMISSION:   06/21/2017

DATE OF DISCHARGE:   





FINAL DIAGNOSES:

1. Lower gastrointestinal bleeding with acute diverticular bleed with 

acute blood loss anemia, symptomatic, status post transfusion.  

2. Status post EGD and colonoscopy showing antral erosive gastritis 

and significant diverticulosis without any stigmata of any recent 

gastrointestinal bleed.  

3. Relative hypotension as well as hypertensive shock secondary to 

acute blood loss anemia, and gastrointestinal bleed, present on 

admission.  

4. Increased WBC, possibly reactive. 

5. Anemia, normocytic. 

6. Coumadin monitoring. 

7. Chronic obstructive pulmonary disease. 

8. History of deep venous thrombosis. 

9. History of gastroesophageal reflux disease. 

10. History of myocardial infarction.

11. History of sleep apnea, CPAP. 

12. History of degenerative joint disease. 

13. History of automatic implantable cardioverter defibrillator. 

14. History of cardiac catheterization and ablation. 

15. History of cholecystectomy. 

16. History of coronary artery disease, coronary artery bypass 

grafting and stent.  

17. History of nicotine dependence. 

18. Obesity, body mass index of 30.6. 

19. 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 

20. FULL CODE. 



DISCHARGE DISPOSITION:  The patient will be discharged in stable 

condition with guarded prognosis.  Total time taken is 35 minutes.  

Discharge cleared by multiple consultants.  



HISTORY OF PRESENT ILLNESS: This 66-year-old gentleman with a past 

medical history of multiple medical problems was admitted with acute 

lower gastrointestinal bleeding and acute symptomatic anemia. 

Hemoglobin dropped to 7.2.  After transfusion currently to stabilized. 

 



EGD and colonoscopy done by Dr. Anderson.  Seen by multiple consultants 

also.  



On exam, vitals are stable. 

CARDIOVASCULAR SYSTEM: S1, S2 muffled. 

ABDOMEN: Soft. Nervous system: No focal deficits.



Hemoglobin 8, at this time. 



Discharge advice: 

1. Diet is cardiac. 

2. Activity limited until follow-up.

3. Follow-up with Dr. Gold in two to three days. 

4. CBC, BMP.

5. Follow-up with Dr. Anderson and cardiology as recommended. 



Medications are as follows: 

1. Albuterol 2.5 q.i.d. and p.r.n. 

2. Zyloprim 200 mg p.o. b.i.d. 

3. Hold the aspirin, Plavix and NSAIDS and Celebrex for now. 

4. Advair 1 puff b.i.d. 

5. Lasix 40 mg daily. 

6. Atrovent 2 puffs q.i.d. 

7. Levaquin 500 mg p.o. daily for 3 days. 

8. Metoprolol 25 mg p.o. b.i.d. 

9. Mexitil 150 mg p.o. q8h.

10. Multivitamin 1 p.o. daily. 

11. Nitrostat 0.4 sublingual p.r.n. 

12. Entresto 25/26 one p.o. b.i.d. half tablet.

13. Aldactone 25 mg daily. 

14. Flomax 0.4 q.h.s. 



Once again, the patient will be discharged in stable condition with 

guarded prognosis.

## 2017-06-25 NOTE — P.PN
Subjective


Principal diagnosis: 





GI bleed


This is a 66-year-old  gentleman with history of atrial fibrillation, 

ischemic cardio myopathy and prior AICD, prior VT, who presented to the 

hospital with a GI bleed.  He underwent a EGD and colonoscopy yesterday.  EGD 

revealed antral erosive gastritis with no evidence of bleeding.  Colonoscopy 

revealed moderate left-sided diverticulosis and small internal hemorrhoids.  

Patient was seen and examined this morning, feels well overall, denies any 

blood in his stool or black stool.  Blood pressure this morning 115/60.  We 

have continue to hold his chest over.  He may be discharged home today, and 

trestle can be resumed as an outpatient.  He has been instructed to take his 

blood pressure twice a day.








Objective





- Vital Signs


Vital signs: 


 Vital Signs











Temp  98.7 F   06/25/17 07:49


 


Pulse  81   06/25/17 11:21


 


Resp  16   06/25/17 11:21


 


BP  115/65   06/25/17 11:21


 


Pulse Ox  96   06/25/17 11:21








 Intake & Output











 06/24/17 06/25/17 06/25/17





 18:59 06:59 18:59


 


Intake Total 920  250


 


Output Total 500 750 600


 


Balance 420 -750 -350


 


Weight 100.6 kg 101.3 kg 


 


Intake:   


 


    


 


    Magnesium Sulfate-D5w Pmx 200  





    1 gm In Dextrose/Water 1   





    100ml.bag @ 100 mls/hr   





    IVPB Q1H [facility] Rx#:   





    [id number]   


 


    Potassium Chloride 10 meq 200  





    Lidocaine 2% Inj 10 mg   





    In Sodium Chloride 0.9%   





    100 ml @ 100 mls/hr IV   





    Q1HR [facility] Rx#:[id number]   


 


    Sodium Chloride 0.9% 1, 120  





    000 ml @ 60 mls/hr IV .   





    [id number] [facility] Rx#:[id number]   


 


  Oral 250  250


 


Output:   


 


  Urine 500 750 600


 


Other:   


 


  Voiding Method Urinal Urinal 


 


  # Voids  0 


 


  # Bowel Movements 1  














- Exam





PHYSICAL EXAMINATION: 





HEENT: Head is atraumatic, normocephalic.  Pupils equal, round.  Neck is 

supple.  There is no elevated jugular venous pressure.





HEART EXAMINATION: Heart S1, S2 normal.  No murmur or gallop heard.





CHEST EXAMINATION: Lungs are clear to auscultation and precussion. No chest 

wall tenderness is noted on palpation or with deep breathing.





ABDOMEN:  Soft, nontender. Bowel sounds are heard. No organomegaly noted.


 


EXTREMITIES: 2+ peripheral pulses with no evidence of peripheral edema and no 

calf tenderness noted.





NEUROLOGIC patient is awake, alert and oriented -3.


 


.


 








- Labs


CBC & Chem 7: 


 06/25/17 04:10





 06/25/17 04:10


Labs: 


 Abnormal Lab Results - Last 24 Hours (Table)











  06/24/17 06/24/17 06/25/17 Range/Units





  15:55 21:38 04:10 


 


RBC  2.96 L  3.00 L   (4.30-5.90)  m/uL


 


Hgb  7.8 L  7.9 L   (13.0-17.5)  gm/dL


 


Hct  25.8 L  25.9 L   (39.0-53.0)  %


 


MCHC  30.3 L  30.3 L   (31.0-37.0)  g/dL


 


RDW  16.7 H  17.0 H   (11.5-15.5)  %


 


Neutrophils #   8.3 H   (1.3-7.7)  k/uL


 


Lymphocytes #  0.8 L    (1.0-4.8)  k/uL


 


Sodium    136 L  (137-145)  mmol/L


 


BUN    8 L  (9-20)  mg/dL














  06/25/17 Range/Units





  04:10 


 


RBC  3.02 L  (4.30-5.90)  m/uL


 


Hgb  8.0 L  (13.0-17.5)  gm/dL


 


Hct  25.9 L  (39.0-53.0)  %


 


MCHC  30.7 L  (31.0-37.0)  g/dL


 


RDW  17.2 H  (11.5-15.5)  %


 


Neutrophils #   (1.3-7.7)  k/uL


 


Lymphocytes #   (1.0-4.8)  k/uL


 


Sodium   (137-145)  mmol/L


 


BUN   (9-20)  mg/dL














Assessment and Plan


(1) Chronic a-fib


Status: Acute   





(2) Ischemic cardiomyopathy


Status: Acute   





(3) AICD (automatic cardioverter/defibrillator) present


Status: Acute   





(4) Acute GI bleeding


Status: Acute   





(5) Warfarin-induced coagulopathy


Status: Acute   





(6) Ventricular tachycardia


Status: Acute   


Plan: 





From cardiology's perspective, we will recommend to continue the patient on his 

current medications.  He's also been instructed to check his blood pressure 

twice a day at home, we will continue to hold the chest which may be resumed as 

an outpatient.





DNP note has been reviewed, I agree with a documented findings and plan of 

care.  Patient was seen and examined.

## 2017-06-29 ENCOUNTER — HOSPITAL ENCOUNTER (EMERGENCY)
Dept: HOSPITAL 47 - EC | Age: 67
Discharge: HOME | End: 2017-06-29
Payer: MEDICARE

## 2017-06-29 VITALS — TEMPERATURE: 98.7 F | SYSTOLIC BLOOD PRESSURE: 112 MMHG | HEART RATE: 89 BPM | DIASTOLIC BLOOD PRESSURE: 70 MMHG

## 2017-06-29 VITALS — RESPIRATION RATE: 18 BRPM

## 2017-06-29 DIAGNOSIS — Z95.810: ICD-10-CM

## 2017-06-29 DIAGNOSIS — Z88.8: ICD-10-CM

## 2017-06-29 DIAGNOSIS — Z86.718: ICD-10-CM

## 2017-06-29 DIAGNOSIS — J44.9: ICD-10-CM

## 2017-06-29 DIAGNOSIS — Z87.891: ICD-10-CM

## 2017-06-29 DIAGNOSIS — I80.8: Primary | ICD-10-CM

## 2017-06-29 DIAGNOSIS — Z79.899: ICD-10-CM

## 2017-06-29 DIAGNOSIS — Z79.51: ICD-10-CM

## 2017-06-29 PROCEDURE — 99284 EMERGENCY DEPT VISIT MOD MDM: CPT

## 2017-06-29 NOTE — ED
General Adult HPI





- General


Chief complaint: Extremity Problem,Nontraumatic


Stated complaint: Dr Linton/Gregory DVT in arm


Time Seen by Provider: 06/29/17 19:28


Source: patient, RN notes reviewed, old records reviewed


Mode of arrival: wheelchair


Limitations: no limitations





- History of Present Illness


Initial comments: 





This is a 66-year-old male to the ER for evaluation.  Today patient presents 

for evaluation of left upper extremity swelling and edema.  Patient has no 

history due to her blood clots.  On blood thinners.  Patient states he was 

recently admitted to the hospital.  Lung [place] time, had 2 different IVs in 

his left upper extremity.  The last 2 days been noticing increasing swelling 

and subsequent pain in that left upper extremity, no erythema.  No other 

complaints





- Related Data


 Home Medications











 Medication  Instructions  Recorded  Confirmed


 


Albuterol Sulfate [Proair Hfa] 1 puff INHALATION RT-QID PRN 06/03/14 06/29/17


 


Fluticasone/Salmeterol [Advair 1 puff INHALATION RT-BID 06/03/14 06/29/17





250-50 Diskus]   


 


Spironolactone [Aldactone] 25 mg PO DAILY 06/03/14 06/29/17


 


Ipratropium Bromide [Atrovent Hfa] 2 puff INHALATION RT-TID PRN 07/08/14 06/29/ 17


 


Furosemide [Lasix] 40 mg PO DAILY 06/02/15 06/29/17


 


Sacubitril/Valsartan [Entresto 24 0.5 tab PO BID 04/07/16 06/29/17





mg-26 mg Tablet]   


 


Allopurinol [Zyloprim] 200 mg PO BID 06/21/17 06/29/17


 


Multivitamins, Thera [Multivitamin 1 tab PO DAILY 06/21/17 06/29/17





(formulary)]   


 


Tamsulosin [Flomax] 0.4 mg PO HS 06/21/17 06/29/17








 Previous Rx's











 Medication  Instructions  Recorded


 


Nitroglycerin Sl Tabs [Nitrostat] 0.4 mg SUBLINGUAL Q5M PRN #25 tab 06/25/15


 


Mexiletine [Mexitil] 150 mg PO Q8HR  cap 04/22/16


 


Albuterol Nebulized [Ventolin 2.5 mg INHALATION RT-QID #1  06/25/17





Nebulized]  


 


Metoprolol Succinate (ER) [Toprol 25 mg PO BID #60 tab 06/25/17





XL]  











 Allergies











Allergy/AdvReac Type Severity Reaction Status Date / Time


 


alirocumab Allergy  Swelling Verified 06/29/17 19:52





[From Praluent Pen]     


 


Statins-Hmg-Coa Reductase Allergy  Swelling/My Verified 06/29/17 19:52





Inhibitor   algia  


 


steriods AdvReac  Abdominal Uncoded 06/29/17 19:10





   Pain/Constipation  














Review of Systems


ROS Statement: 


Those systems with pertinent positive or pertinent negative responses have been 

documented in the HPI.





ROS Other: All systems not noted in ROS Statement are negative.





Past Medical History


Past Medical History: COPD, Deep Vein Thrombosis (DVT), GERD/Reflux, Myocardial 

Infarction (MI), Osteoarthritis (OA), Pulmonary Embolus (PE), Sleep Apnea/CPAP/

BIPAP


Additional Past Medical History / Comment(s): SLEEP APNEA DOES'NT USE CPAP 

MACHINE.  SEE DR LADONNA CLEMENT, "NOSE BLEEDS SINCE MONDAY" (2016)


Last Myocardial Infarction Date:: 1999


History of Any Multi-Drug Resistant Organisms: None Reported


Past Surgical History: AICD, Cardiac Ablation, Cholecystectomy, Coronary Bypass/

CABG, EPS, Heart Catheterization With Stent, Orthopedic Surgery


Additional Past Surgical History / Comment(s): CARDIOVERSIONS, CABG X4 (12/8/13)

, STENT X3, VERO FILTER,   RT ANKLE SX WITH MESH. 4-12-16 ep study-see dr ladonna clement


Past Anesthesia/Blood Transfusion Reactions: No Reported Reaction


Additional Past Anesthesia/Blood Transfusion Reaction / Comment(s): MOM PONV


Date of Last Stent Placement:: 06/2015


Type of Cardiac Device: AICD


Device Placement Date:: UNKNOWN, [employer]


Past Psychological History: No Psychological Hx Reported


Smoking Status: Former smoker


Past Alcohol Use History: None Reported


Past Drug Use History: None Reported





- Past Family History


  ** Brother(s)


Family Medical History: Cancer


Additional Family Medical History / Comment(s): LEUKEMIA





  ** Mother


Additional Family Medical History / Comment(s): ENLARGED HEART





  ** Father


Additional Family Medical History / Comment(s): BLOOD CLOT IN THE BRAIN





General Exam





- General Exam Comments


Initial Comments: 





Significant slight left upper extremity from elbow to hand, positive radial 

pulse 2+, no erythema no warmth


Limitations: no limitations


General appearance: alert, in no apparent distress


Head exam: Present: atraumatic, normocephalic, normal inspection


Eye exam: Present: normal appearance, PERRL, EOMI.  Absent: scleral icterus, 

conjunctival injection, periorbital swelling


ENT exam: Present: normal exam, mucous membranes moist


Neck exam: Present: normal inspection.  Absent: tenderness, meningismus, 

lymphadenopathy


Respiratory exam: Present: normal lung sounds bilaterally.  Absent: respiratory 

distress, wheezes, rales, rhonchi, stridor


Cardiovascular Exam: Present: regular rate, normal rhythm, normal heart sounds.

  Absent: systolic murmur, diastolic murmur, rubs, gallop, clicks


GI/Abdominal exam: Present: soft, normal bowel sounds.  Absent: distended, 

tenderness, guarding, rebound, rigid


Extremities exam: Present: normal inspection, full ROM, normal capillary 

refill.  Absent: tenderness, pedal edema, joint swelling, calf tenderness


Back exam: Present: normal inspection


Neurological exam: Present: alert, oriented X3, CN II-XII intact


Psychiatric exam: Present: normal affect, normal mood


Skin exam: Present: warm, dry, intact, normal color.  Absent: rash





Course


 Vital Signs











  06/29/17 06/29/17 06/29/17





  19:06 20:00 21:12


 


Temperature 97.5 F L  


 


Pulse Rate 84 79 78


 


Respiratory 20 18 18





Rate   


 


Blood Pressure 110/66 118/89 117/62


 


O2 Sat by Pulse 100 98 98





Oximetry   














Medical Decision Making





- Medical Decision Making





66 Kamaljit for evaluation of left upper extremity edema secondary to IVs and left 

upper extremity, no evidence of DVT.  Patient will be discharged home to 

continue to raisin elevate left upper extremity





- Radiology Data


Radiology results: report reviewed (Ultrasound left upper extremity negative 

for DVT), image reviewed





Disposition


Clinical Impression: 


 Arm edema, Edema of upper extremity, Thrombophlebitis of arm





Narrative: 





Left upper extremity edema, thrombophlebitis, superficial, no DVT


Disposition: HOME SELF-CARE


Condition: Good


Instructions:  Edema (ED)


Referrals: 


None,Stated [Primary Care Provider] - 1-2 days

## 2017-06-29 NOTE — US
EXAMINATION TYPE: US VENOUS DOPPLER DUPLEX UE LT

 

DATE OF EXAM: 6/29/2017

 

COMPARISON: NONE

 

CLINICAL HISTORY: Pain and swelling left arm after recent IV.

 

SIDE PERFORMED: Left 

 

LEFT UPPER EXTREMITY: No evidence of venous filling defect.

 

 

IMPRESSION:  

Negative for deep venous thrombosis.

## 2021-06-14 ENCOUNTER — HOSPITAL ENCOUNTER (OUTPATIENT)
Dept: HOSPITAL 47 - CATHEP | Age: 71
Discharge: HOME | End: 2021-06-14
Attending: INTERNAL MEDICINE
Payer: MEDICARE

## 2021-06-14 VITALS — HEART RATE: 73 BPM | DIASTOLIC BLOOD PRESSURE: 80 MMHG | SYSTOLIC BLOOD PRESSURE: 130 MMHG

## 2021-06-14 VITALS — TEMPERATURE: 97.9 F | RESPIRATION RATE: 16 BRPM

## 2021-06-14 VITALS — BODY MASS INDEX: 31.1 KG/M2

## 2021-06-14 DIAGNOSIS — Z45.02: Primary | ICD-10-CM

## 2021-06-14 DIAGNOSIS — Z95.810: ICD-10-CM

## 2021-06-14 DIAGNOSIS — I25.5: ICD-10-CM

## 2021-06-14 DIAGNOSIS — I11.0: ICD-10-CM

## 2021-06-14 DIAGNOSIS — I25.10: ICD-10-CM

## 2021-06-14 DIAGNOSIS — I47.2: ICD-10-CM

## 2021-06-14 DIAGNOSIS — I50.9: ICD-10-CM

## 2021-06-14 DIAGNOSIS — Z79.899: ICD-10-CM

## 2021-06-14 DIAGNOSIS — J44.9: ICD-10-CM

## 2021-06-14 DIAGNOSIS — Z20.822: ICD-10-CM

## 2021-06-14 DIAGNOSIS — I48.19: ICD-10-CM

## 2021-06-14 DIAGNOSIS — Z79.01: ICD-10-CM

## 2021-06-14 LAB
INR PPP: 2.6 (ref ?–1.2)
PT BLD: 25.5 SEC (ref 9–12)

## 2021-06-14 PROCEDURE — 85610 PROTHROMBIN TIME: CPT

## 2021-06-14 PROCEDURE — 87635 SARS-COV-2 COVID-19 AMP PRB: CPT

## 2021-06-14 PROCEDURE — 33264 RMVL & RPLCMT DFB GEN MLT LD: CPT

## 2021-06-14 NOTE — CE
CARDIAC ELECTROPHYSIOLOGY REPORT



This is a 70-year-old male patient with ischemic cardiomyopathy, congestive heart

failure class 2-3 with a Bi V ICD and underlying persistent atrial fibrillation.



His Bi V ICD was at [facility] and he was brought in for an Bi V ICD generator change.  His LV

thresholds are elevated.



The patient is brought to the EP lab in a fasting state. Written informed consent was

obtained prior to the procedure.  Fluoroscopy with the leads was performed.  No

fractures or breaks were noted in the RV lead on the LV lead.



The left pectoral area was prepped and draped as per protocol.  1% lidocaine was used

for local anesthesia.  A 4 cm incision was made over the generator and carried down to

the level of the generator.  A partial capsulectomy was performed.  The leads were

disconnected from the [employer] device and the new generator was implanted

([employer] model number [id number], serial number [id number].



The atrial lead was a Guidant 4470, 52 cm in length and serial #[id number] and was

originally implanted in 2002. The patient was in atrial fibrillation.



Chronic ICD lead was a Guid[employer] model #0148, 64 cm length and serial #[id number]. No

fractures or breaks on fluoroscopy.



The LV lead was a [employer] model #1156T, serial #[id number] implanted in the

lateral vein.



The LV threshold was elevated and chronically so 2.75 V at 1.5 milliseconds, pacing

impedance of 1513 ohms, R-waves of 27 mV.



The new generator was implanted.  Hemostasis was assured and the wound was closed in 3

layers and dressed per protocol.



Previously, the patient's DFT was at 11 joules.



The atrial pacing impedance was 494 ohms.  RV pacing impedance was 874 ohms. R-waves 17

mV, high-voltage impedance 60 ohms and pacing threshold 1 V at 0.4 milliseconds for the

ICD lead.



The device was then programmed to VT zone of 176 beats per minute, VF zone at 222 beats

per minute.  Appropriate antitachycardia pacing cardioversion defibrillation

programmed. Wave lead for morphology discrimination was turned on with first

cardioversion at 11 joules, first defibrillation at 20 joules, appropriate

antitachycardia pacing cardioversion defibrillation programmed. LV offset 20

milliseconds.



RESULT:

1. Successful Bi V ICD generator change.

2. No fractures or breaks noted in the LV lead.





MMODL / IJN: [id number] / Job#: [id number]